# Patient Record
Sex: FEMALE | Race: WHITE | NOT HISPANIC OR LATINO | Employment: OTHER | URBAN - METROPOLITAN AREA
[De-identification: names, ages, dates, MRNs, and addresses within clinical notes are randomized per-mention and may not be internally consistent; named-entity substitution may affect disease eponyms.]

---

## 2023-01-25 PROBLEM — E04.1 THYROID NODULE: Status: ACTIVE | Noted: 2023-01-25

## 2023-01-25 PROBLEM — M79.10 MYALGIA: Status: ACTIVE | Noted: 2023-01-25

## 2023-01-25 PROBLEM — R42 VERTIGO: Status: ACTIVE | Noted: 2023-01-25

## 2023-01-25 PROBLEM — H93.19 TINNITUS: Status: ACTIVE | Noted: 2023-01-25

## 2023-01-25 PROBLEM — M48.02 STENOSIS, CERVICAL SPINE: Status: ACTIVE | Noted: 2023-01-25

## 2023-01-25 PROBLEM — M81.0 OSTEOPOROSIS: Status: ACTIVE | Noted: 2023-01-25

## 2023-01-25 PROBLEM — H53.9 VISUAL CHANGES: Status: ACTIVE | Noted: 2023-01-25

## 2023-01-25 PROBLEM — H81.10 BENIGN POSITIONAL VERTIGO: Status: ACTIVE | Noted: 2023-01-25

## 2023-01-25 PROBLEM — M54.50 LEFT-SIDED LOW BACK PAIN WITHOUT SCIATICA: Status: ACTIVE | Noted: 2023-01-25

## 2023-01-25 PROBLEM — M25.559 ARTHRALGIA OF HIP: Status: ACTIVE | Noted: 2023-01-25

## 2023-01-25 PROBLEM — R05.9 COUGH: Status: ACTIVE | Noted: 2023-01-25

## 2023-01-25 PROBLEM — S20.219A STERNAL CONTUSION: Status: ACTIVE | Noted: 2023-01-25

## 2023-01-25 PROBLEM — R39.9 URINARY SYMPTOM OR SIGN: Status: ACTIVE | Noted: 2023-01-25

## 2023-01-25 PROBLEM — R09.81 SINUS CONGESTION: Status: ACTIVE | Noted: 2023-01-25

## 2023-01-25 PROBLEM — R53.83 FATIGUE: Status: ACTIVE | Noted: 2023-01-25

## 2023-01-25 PROBLEM — M41.9 SCOLIOSIS: Status: ACTIVE | Noted: 2023-01-25

## 2023-01-25 PROBLEM — R79.82 CRP ELEVATED: Status: ACTIVE | Noted: 2023-01-25

## 2023-01-25 PROBLEM — R53.1 WEAKNESS GENERALIZED: Status: ACTIVE | Noted: 2023-01-25

## 2023-01-25 PROBLEM — R00.2 PALPITATIONS: Status: ACTIVE | Noted: 2023-01-25

## 2023-01-25 PROBLEM — R76.0 ABNORMAL ANTINUCLEAR ANTIBODY TITER: Status: ACTIVE | Noted: 2023-01-25

## 2023-01-25 PROBLEM — M54.2 NECK PAIN: Status: ACTIVE | Noted: 2023-01-25

## 2023-01-25 PROBLEM — M25.579 ANKLE PAIN: Status: ACTIVE | Noted: 2023-01-25

## 2023-01-25 PROBLEM — R06.00 DYSPNEA: Status: ACTIVE | Noted: 2023-01-25

## 2023-01-25 PROBLEM — R51.9 HEAD ACHE: Status: ACTIVE | Noted: 2023-01-25

## 2023-01-25 PROBLEM — R29.3 POOR POSTURE: Status: ACTIVE | Noted: 2023-01-25

## 2023-01-25 PROBLEM — R07.89 ATYPICAL CHEST PAIN: Status: ACTIVE | Noted: 2023-01-25

## 2023-01-25 PROBLEM — M25.50 ARTHRALGIA: Status: ACTIVE | Noted: 2023-01-25

## 2023-01-25 PROBLEM — R03.0 ELEVATED BLOOD PRESSURE READING: Status: ACTIVE | Noted: 2023-01-25

## 2023-01-25 PROBLEM — M26.629 TMJ SYNDROME: Status: ACTIVE | Noted: 2023-01-25

## 2023-01-25 PROBLEM — M16.9 OSTEOARTHRITIS OF HIP: Status: ACTIVE | Noted: 2023-01-25

## 2023-01-25 PROBLEM — M54.9 BACK PAIN: Status: ACTIVE | Noted: 2023-01-25

## 2023-01-25 PROBLEM — F41.9 ANXIETY: Status: ACTIVE | Noted: 2023-01-25

## 2023-01-25 PROBLEM — E78.00 HYPERCHOLESTEREMIA: Status: ACTIVE | Noted: 2023-01-25

## 2023-01-25 RX ORDER — DULOXETIN HYDROCHLORIDE 60 MG/1
1 CAPSULE, DELAYED RELEASE ORAL DAILY
COMMUNITY
Start: 2021-12-08 | End: 2023-05-31 | Stop reason: SDUPTHER

## 2023-01-25 RX ORDER — CYCLOSPORINE 0.5 MG/ML
1 EMULSION OPHTHALMIC 2 TIMES DAILY
COMMUNITY
Start: 2021-03-16

## 2023-01-25 RX ORDER — ALIROCUMAB 150 MG/ML
150 INJECTION, SOLUTION SUBCUTANEOUS
COMMUNITY
End: 2023-12-17 | Stop reason: SDUPTHER

## 2023-01-25 RX ORDER — LORAZEPAM 1 MG/1
1-2 TABLET ORAL
COMMUNITY
End: 2023-01-25 | Stop reason: ENTERED-IN-ERROR

## 2023-01-25 RX ORDER — FLUTICASONE PROPIONATE 50 MCG
2 SPRAY, SUSPENSION (ML) NASAL DAILY
COMMUNITY
Start: 2021-06-01 | End: 2023-05-11 | Stop reason: WASHOUT

## 2023-01-25 RX ORDER — TRETINOIN 0.25 MG/G
CREAM TOPICAL DAILY
COMMUNITY
Start: 2021-07-15 | End: 2023-10-18 | Stop reason: ALTCHOICE

## 2023-03-09 ENCOUNTER — OFFICE VISIT (OUTPATIENT)
Dept: PRIMARY CARE | Facility: CLINIC | Age: 68
End: 2023-03-09
Payer: MEDICARE

## 2023-03-09 VITALS
WEIGHT: 121.5 LBS | SYSTOLIC BLOOD PRESSURE: 150 MMHG | DIASTOLIC BLOOD PRESSURE: 82 MMHG | HEART RATE: 82 BPM | TEMPERATURE: 97.9 F | BODY MASS INDEX: 22.22 KG/M2

## 2023-03-09 DIAGNOSIS — D72.818 OTHER DECREASED WHITE BLOOD CELL (WBC) COUNT: ICD-10-CM

## 2023-03-09 DIAGNOSIS — M81.8 OTHER OSTEOPOROSIS WITHOUT CURRENT PATHOLOGICAL FRACTURE: ICD-10-CM

## 2023-03-09 DIAGNOSIS — Z00.00 ROUTINE GENERAL MEDICAL EXAMINATION AT A HEALTH CARE FACILITY: ICD-10-CM

## 2023-03-09 DIAGNOSIS — I10 ESSENTIAL HYPERTENSION: Primary | ICD-10-CM

## 2023-03-09 PROBLEM — R03.0 ELEVATED BLOOD PRESSURE READING: Status: RESOLVED | Noted: 2023-01-25 | Resolved: 2023-03-09

## 2023-03-09 PROBLEM — R06.00 DYSPNEA: Status: RESOLVED | Noted: 2023-01-25 | Resolved: 2023-03-09

## 2023-03-09 PROBLEM — S20.219A STERNAL CONTUSION: Status: RESOLVED | Noted: 2023-01-25 | Resolved: 2023-03-09

## 2023-03-09 PROBLEM — R39.9 URINARY SYMPTOM OR SIGN: Status: RESOLVED | Noted: 2023-01-25 | Resolved: 2023-03-09

## 2023-03-09 PROBLEM — R42 VERTIGO: Status: RESOLVED | Noted: 2023-01-25 | Resolved: 2023-03-09

## 2023-03-09 PROBLEM — H93.19 TINNITUS: Status: RESOLVED | Noted: 2023-01-25 | Resolved: 2023-03-09

## 2023-03-09 PROBLEM — R05.9 COUGH: Status: RESOLVED | Noted: 2023-01-25 | Resolved: 2023-03-09

## 2023-03-09 PROBLEM — R51.9 HEAD ACHE: Status: RESOLVED | Noted: 2023-01-25 | Resolved: 2023-03-09

## 2023-03-09 PROBLEM — M25.579 ANKLE PAIN: Status: RESOLVED | Noted: 2023-01-25 | Resolved: 2023-03-09

## 2023-03-09 PROCEDURE — 3079F DIAST BP 80-89 MM HG: CPT | Performed by: INTERNAL MEDICINE

## 2023-03-09 PROCEDURE — 1036F TOBACCO NON-USER: CPT | Performed by: INTERNAL MEDICINE

## 2023-03-09 PROCEDURE — 3077F SYST BP >= 140 MM HG: CPT | Performed by: INTERNAL MEDICINE

## 2023-03-09 PROCEDURE — 1160F RVW MEDS BY RX/DR IN RCRD: CPT | Performed by: INTERNAL MEDICINE

## 2023-03-09 PROCEDURE — 1159F MED LIST DOCD IN RCRD: CPT | Performed by: INTERNAL MEDICINE

## 2023-03-09 PROCEDURE — 99214 OFFICE O/P EST MOD 30 MIN: CPT | Performed by: INTERNAL MEDICINE

## 2023-03-09 RX ORDER — OLMESARTAN MEDOXOMIL 20 MG/1
20 TABLET ORAL DAILY
Qty: 90 TABLET | Refills: 0 | Status: SHIPPED | OUTPATIENT
Start: 2023-03-09 | End: 2023-05-31 | Stop reason: WASHOUT

## 2023-03-09 NOTE — ASSESSMENT & PLAN NOTE
Double vision of unclear etiology, glucose readings in the past unremarkable.  Seen by ophthalmology without etiology determined.

## 2023-03-09 NOTE — PROGRESS NOTES
Subjective   Stacey Davis is a 67 y.o. female who presents for Hypertension and Establish Care.  HPI  Pt is here for followup visit, last seen for establishment of care in January. Due for AWV     1/23: endocrinology referral for thyroid nodules.  2/23: labs notable for mild leukopenia repeat 1-2 months, lipids improved other labs good    PMHx:  - HLD - severe statin intolerance now on praluent followed by Dr. Baldwin with improvement in lipid profile. Father with MI at 46.   - Osteoporosis severe followed by rheum recommended medical management has had severe side effects on fosamax   - Anxiety and arthritic pain - longstanding on cymbalta 60mg concerned regarding weight gain potential.  Gets rib pain, left hip pain improved, pressure in cervical area.   - Vertigo + tinnitus - triggered by stress recommended vestibular rehab by ENT (son in law)   - S/p hysterectomy   - Elevated BP recommended home BP monitoring   - Family history of breast cancer no known genetic risk present (4/6 women in her family)  - Thyroid nodule - due for repeat imaging     Social:   -  works for the [employer]  - No tobacco, significant alcohol or drugs   - Lives at home with  (dentist) and dogs, children and grandchildren live in the city.    Review as systems as indicated in HPI, otherwise unremarkable.     Objective     /82   Pulse 82   Temp 36.6 °C (97.9 °F)   Wt 55.1 kg (121 lb 8 oz)   BMI 22.22 kg/m²      Physical Exam  Vitals reviewed.   Constitutional:       Appearance: Normal appearance.   HENT:      Head: Normocephalic and atraumatic.      Nose: Nose normal.   Eyes:      Conjunctiva/sclera: Conjunctivae normal.   Pulmonary:      Effort: Pulmonary effort is normal.   Neurological:      Mental Status: She is alert.   Psychiatric:         Mood and Affect: Mood normal.         Behavior: Behavior normal.         Thought Content: Thought content normal.         Judgment: Judgment normal.         Problem  List Items Addressed This Visit          Circulatory    Essential hypertension - Primary     BP consistently elevated consistent with hypertension, will initiate olmesartan 20mg, check BMP in 2 weeks. Potential side effects reviewed.   - Continue home BP monitoring if possible.   Followup in 1-2 months          Relevant Medications    olmesartan (BENIcar) 20 mg tablet    Other Relevant Orders    Basic metabolic panel    Follow Up In [facility] - PCP       Musculoskeletal    Osteoporosis     Last seen by rheumatology intolerant to fosamax, encouraged followup to consider alternate treatment modalities          Other Visit Diagnoses       Other decreased white blood cell (WBC) count        Relevant Orders    CBC and Auto Differential    Follow Up In [facility] - PCP    Routine general medical examination at a health care facility        Relevant Orders    Follow Up In [facility] - PCP            Health maintenance:   - Cancer screening: pending colonoscopy tomorrow   AWV at next visit

## 2023-03-09 NOTE — ASSESSMENT & PLAN NOTE
Now on Praluent with improvement in LDL readings, will follow-up with Dr. Garnett regarding additional management.  She is currently on max dose.  No significant side effects.  Intolerant to all additional statins

## 2023-03-09 NOTE — ASSESSMENT & PLAN NOTE
Improved with cymbalta, though notable weight gain. Would continue current course without dosage adjustment. Continue healthy lifestyle

## 2023-03-09 NOTE — PATIENT INSTRUCTIONS
Stacey, your homework:   - Make an appointment with the endocrinologist, Dr. Megan Hagen   - For blood pressure, start OLMESARTAN 20MG once a day   - Check blood work in 2 weeks' time.   - Continue to monitor your home blood pressure readings     Followup with me in 1-2 months for ANNUAL WELLNESS VISIT

## 2023-03-09 NOTE — ASSESSMENT & PLAN NOTE
Last seen by rheumatology intolerant to fosamax, encouraged followup to consider alternate treatment modalities

## 2023-03-09 NOTE — ASSESSMENT & PLAN NOTE
Stable on repeat imaging, though with some characteristics that would prefer to be evaluated by endocrinology.  Encouraged her to schedule appointment with Dr. Hagen.

## 2023-03-09 NOTE — ASSESSMENT & PLAN NOTE
BP consistently elevated consistent with hypertension, will initiate olmesartan 20mg, check BMP in 2 weeks. Potential side effects reviewed.   - Continue home BP monitoring if possible.   Followup in 1-2 months

## 2023-05-02 DIAGNOSIS — H53.2 DOUBLE VISION: Primary | ICD-10-CM

## 2023-05-02 NOTE — PROGRESS NOTES
Spoke to patient's ophthalmologist over the phone. There are no concerning opthalmologic findings on examination to explain the patient's double vision. He has treated with prism lenses with improvement in symptoms, though that does not explain the underlying cause. After further discussion, elected to pursue follow up with neurology in consideration for further workup. She has an upcoming appointment scheduled on 5/11 for follow up with me as well. Will reach out to patient to begin scheduling with neurology.

## 2023-05-10 PROBLEM — R09.81 SINUS CONGESTION: Status: RESOLVED | Noted: 2023-01-25 | Resolved: 2023-05-10

## 2023-05-11 ENCOUNTER — OFFICE VISIT (OUTPATIENT)
Dept: PRIMARY CARE | Facility: CLINIC | Age: 68
End: 2023-05-11
Payer: MEDICARE

## 2023-05-11 VITALS
TEMPERATURE: 97.9 F | SYSTOLIC BLOOD PRESSURE: 138 MMHG | BODY MASS INDEX: 22.41 KG/M2 | HEART RATE: 76 BPM | DIASTOLIC BLOOD PRESSURE: 76 MMHG | WEIGHT: 122.5 LBS

## 2023-05-11 DIAGNOSIS — M48.02 STENOSIS, CERVICAL SPINE: ICD-10-CM

## 2023-05-11 DIAGNOSIS — E04.1 THYROID NODULE: ICD-10-CM

## 2023-05-11 DIAGNOSIS — H53.2 DOUBLE VISION: ICD-10-CM

## 2023-05-11 DIAGNOSIS — R07.89 ATYPICAL CHEST PAIN: ICD-10-CM

## 2023-05-11 DIAGNOSIS — H81.10 BENIGN PAROXYSMAL POSITIONAL VERTIGO, UNSPECIFIED LATERALITY: ICD-10-CM

## 2023-05-11 DIAGNOSIS — F41.9 ANXIETY: Primary | ICD-10-CM

## 2023-05-11 DIAGNOSIS — I10 ESSENTIAL HYPERTENSION: ICD-10-CM

## 2023-05-11 DIAGNOSIS — M81.0 OSTEOPOROSIS, UNSPECIFIED OSTEOPOROSIS TYPE, UNSPECIFIED PATHOLOGICAL FRACTURE PRESENCE: ICD-10-CM

## 2023-05-11 DIAGNOSIS — M25.50 ARTHRALGIA, UNSPECIFIED JOINT: ICD-10-CM

## 2023-05-11 DIAGNOSIS — E78.00 HYPERCHOLESTEREMIA: ICD-10-CM

## 2023-05-11 DIAGNOSIS — H53.9 VISUAL CHANGES: ICD-10-CM

## 2023-05-11 PROCEDURE — 1170F FXNL STATUS ASSESSED: CPT | Performed by: INTERNAL MEDICINE

## 2023-05-11 PROCEDURE — 1159F MED LIST DOCD IN RCRD: CPT | Performed by: INTERNAL MEDICINE

## 2023-05-11 PROCEDURE — 1036F TOBACCO NON-USER: CPT | Performed by: INTERNAL MEDICINE

## 2023-05-11 PROCEDURE — G0402 INITIAL PREVENTIVE EXAM: HCPCS | Performed by: INTERNAL MEDICINE

## 2023-05-11 PROCEDURE — 3078F DIAST BP <80 MM HG: CPT | Performed by: INTERNAL MEDICINE

## 2023-05-11 PROCEDURE — 3075F SYST BP GE 130 - 139MM HG: CPT | Performed by: INTERNAL MEDICINE

## 2023-05-11 PROCEDURE — 1160F RVW MEDS BY RX/DR IN RCRD: CPT | Performed by: INTERNAL MEDICINE

## 2023-05-11 PROCEDURE — 99214 OFFICE O/P EST MOD 30 MIN: CPT | Performed by: INTERNAL MEDICINE

## 2023-05-11 RX ORDER — AMLODIPINE BESYLATE 5 MG/1
5 TABLET ORAL DAILY
Qty: 30 TABLET | Refills: 11 | Status: SHIPPED | OUTPATIENT
Start: 2023-05-11 | End: 2023-05-31 | Stop reason: WASHOUT

## 2023-05-11 ASSESSMENT — ACTIVITIES OF DAILY LIVING (ADL)
DOING_HOUSEWORK: INDEPENDENT
MANAGING_FINANCES: INDEPENDENT
GROCERY_SHOPPING: INDEPENDENT
DRESSING: INDEPENDENT
TAKING_MEDICATION: INDEPENDENT
BATHING: INDEPENDENT

## 2023-05-11 ASSESSMENT — PATIENT HEALTH QUESTIONNAIRE - PHQ9
SUM OF ALL RESPONSES TO PHQ9 QUESTIONS 1 AND 2: 0
2. FEELING DOWN, DEPRESSED OR HOPELESS: NOT AT ALL
1. LITTLE INTEREST OR PLEASURE IN DOING THINGS: NOT AT ALL

## 2023-05-11 NOTE — ASSESSMENT & PLAN NOTE
BP readings remain uncontrolled despite olmesartan 20mg, will start amlodipine 5mg in addition.   Continue home blood pressure monitoring  Counseling provided on appropriate monitoring technique and measurement parameters.   Will prescribe combination pill once completing individual pills.

## 2023-05-11 NOTE — PROGRESS NOTES
Subjective   Reason for Visit: Stacey Davis is an 67 y.o. female here for a Medicare Wellness visit.     Past Medical, Surgical, and Family History reviewed and updated in chart.    Reviewed all medications by prescribing practitioner or clinical pharmacist (such as prescriptions, OTCs, herbal therapies and supplements) and documented in the medical record.  HPI  67F here for welcome to medicare visit and followup of chronic conditions, last seen in January for establishment of care.     4/23: mammo good  - Optho double vision prescribed prism.  Consider TA and Myasthenia gravis referred to neuromuscular neurology out of concern for MG or MS. She was recently seen by cardiology recommended stress test for prognostic purposes. She has been using the prism glasses which has been working for her. She made an appointment with Dr. Fernandez.   - Weight gain, continues to have progressive weight gain despite no changes in lifestyle. She continues to do pilates, hasn't chnged her diet. Has felt glynn sensation in her belly.    PMHx:  - Elevated BP recommended home monitoring on olmesartan 20mg believes that this improving. On average her blood pressure readings   - Thyroid nodule recommended endocirnology has upcoming appointment scheduled   - HLD with statin intolerance on ezetimibe and praluent  strong family history of CAD - [facility] by [facility], father with MI at 46 recommended stress testing for prognostic purposes.   - Osteoporosis severe followed by rheum recommended medical management - developed severe side effects to fosamax last bone density done 1/2020.   - Anxiety and severe arthritis - rib and left hip pain improved on cymbalta 60mg with some weight gain now well controlled   - Vertigo and Tinnitus - seen by ENT (son in law) gets frequent episodes,  frequent episodes recommended consideration for vestibular rehab. thought perhaps precipitated by stress. Not diagnosed with Meniere's.   - S/p hysterectomy    - Family history of breast cancer - gets regular breast cancer screening no known genetic variant     Family:   - 4/6 women in her family have breast cancer, no known genetic pathology found     Lifestyle   - Diet - very healthy as described   - Exercise - peloton, pilates, walks a lot     Social:   -  works for the [email or web address]   - No tobacco, significant alcohol or drugs   - Lives at home with  (dentist) and dogs, children and grandchildren live in the city     Patient Care Team:  Kaleigh Andersen DO as PCP - General  Asia Bob MD as PCP - [facility] Attributed Provider     Review of Systems  General: No fevers, chills, significant changes in weight  HEENT: as described   Heart: No chest pain, palpitations, dyspnea on exertion  Lungs: No cough, wheezing, shortness of breath  Abdomen: No diarrhea, constipation, no nausea, no vomiting, no blood in stool, no dark stools.  Heme: No bleeding or thrombosis issues  Musculoskeletal: as described   Neuro: No weakness, sensory deficits, confusion.   Psych: +anxiety as described   Urinary: No urinary complaints     Objective   Vitals:  /76   Pulse 76   Temp 36.6 °C (97.9 °F)   Wt 55.6 kg (122 lb 8 oz)   BMI 22.41 kg/m²       Physical Exam  General: Appears comfortable, NAD, appropriate affect  HEENT: NCAT, EOMI, pupils symmetric, no conjunctival erythema   Neck: Supple, no LAD   Heart: RRR S1 S2 no murmurs appreciated   Lungs: CTA bilaterally, no rhonchi, rales, or wheezes   Abdomen: Soft, NT/ND, no rebound or guarding, NABS   Extremities: no cyanosis or edema appreciated  Neuro: AAO x 3, answers questions appropriately, no FND, gait unremarkable      Assessment/Plan   Problem List Items Addressed This Visit          Nervous    Stenosis, cervical spine    Current Assessment & Plan     Pain improvement with cymbalta, will hold off on discontinuation.            Circulatory    Essential hypertension    Current Assessment & Plan     BP  readings remain uncontrolled despite olmesartan 20mg, will start amlodipine 5mg in addition.   Continue home blood pressure monitoring  Counseling provided on appropriate monitoring technique and measurement parameters.   Will prescribe combination pill once completing individual pills.            Relevant Medications    amLODIPine (Norvasc) 5 mg tablet    Other Relevant Orders    Follow Up In [facility] - PCP       Musculoskeletal    Arthralgia    Current Assessment & Plan     On cymbalta though with concerned about excess weight gain on it.   This medication does help control her symptoms and keeps her exercising. At present, the benefits of this medication appear to outweigh the risks.          Osteoporosis    Overview     Begin resistance exercise           Current Assessment & Plan     To followup with rheumatology regarding medical management options.            Endocrine/Metabolic    Thyroid nodule    Current Assessment & Plan     Pending evaluation with endocrinology, visit scheduled.            Other    Anxiety - Primary    Current Assessment & Plan     Improved with cymbalta 60mg          Atypical chest pain    Current Assessment & Plan     Pending stress testing          Benign positional vertigo    Hypercholesteremia    Overview     AVOID EXOGENOUS CHOLESTEROL         Visual changes    Relevant Orders    MR brain w and wo IV contrast    Creatinine, Serum    Double vision    Current Assessment & Plan     Unclear etiology, improved with prism vision. No known pathology per optho. Has upcoming appt scheduled with neurology for further workup but in the interim will obtain MRI with contrast for further evaluation.              Adult Health Examination  Age appropriate screening performed   Depression screen negative   No additional pertinent family history or toxic habits   No high risk sexual behavior,   Cancer screening:   - Colonoscopy 3/23: repeat 5 years   - Mammogram 4/23   - Pap smear 12/16  has had full hysterectomy   - Skin yearly FSE with Dr. Fuchs at [facility]   Immunizations: UTD with flu shot, consider second bivalent booster, UTD with pneumococcal vaccination series, did get shingles 10 years ago.   Dental and visual examinations   Discussed adequate Vitamin D intake     Followup 3 months

## 2023-05-11 NOTE — ASSESSMENT & PLAN NOTE
Unclear etiology, improved with prism vision. No known pathology per optho. Has upcoming appt scheduled with neurology for further workup but in the interim will obtain MRI with contrast for further evaluation.

## 2023-05-11 NOTE — PATIENT INSTRUCTIONS
It was a pleasure to see you today! Here is a list of things we have discussed and to follow up on:   Blood pressure - we are adding AMLODIPINE 5MG to olmesartan 20mg. Continue to measure home blood pressure readings. When you run out, send me a message and I will send both of these medications in a combination pill.   For double vision - I have ordered an MRI of the brain. Keep your appointment with the neurologist   Consider second COVID bivalent booster and SHINGRIX vaccination series (2 doses  by 2-6 months)  Followup in 2-3 months

## 2023-05-11 NOTE — ASSESSMENT & PLAN NOTE
On cymbalta though with concerned about excess weight gain on it.   This medication does help control her symptoms and keeps her exercising. At present, the benefits of this medication appear to outweigh the risks.

## 2023-05-30 ENCOUNTER — TELEPHONE (OUTPATIENT)
Dept: PRIMARY CARE | Facility: CLINIC | Age: 68
End: 2023-05-30
Payer: MEDICARE

## 2023-05-30 DIAGNOSIS — M25.559 ARTHRALGIA OF HIP, UNSPECIFIED LATERALITY: Primary | ICD-10-CM

## 2023-05-31 DIAGNOSIS — I10 ESSENTIAL HYPERTENSION: Primary | ICD-10-CM

## 2023-05-31 RX ORDER — AMLODIPINE AND OLMESARTAN MEDOXOMIL 5; 20 MG/1; MG/1
1 TABLET ORAL DAILY
Qty: 90 TABLET | Refills: 1 | Status: SHIPPED | OUTPATIENT
Start: 2023-05-31 | End: 2023-11-24

## 2023-05-31 RX ORDER — DULOXETIN HYDROCHLORIDE 60 MG/1
60 CAPSULE, DELAYED RELEASE ORAL DAILY
Qty: 90 CAPSULE | Refills: 1 | Status: SHIPPED | OUTPATIENT
Start: 2023-05-31 | End: 2023-09-08 | Stop reason: SINTOL

## 2023-06-26 ENCOUNTER — LAB (OUTPATIENT)
Dept: LAB | Facility: LAB | Age: 68
End: 2023-06-26
Payer: MEDICARE

## 2023-06-26 DIAGNOSIS — H53.9 VISUAL CHANGES: ICD-10-CM

## 2023-06-26 LAB
CHOLESTEROL (MG/DL) IN SER/PLAS: 224 MG/DL (ref 0–199)
CHOLESTEROL IN HDL (MG/DL) IN SER/PLAS: 76 MG/DL
CHOLESTEROL/HDL RATIO: 2.9
CREATININE (MG/DL) IN SER/PLAS: 0.88 MG/DL (ref 0.5–1.05)
GFR FEMALE: 72 ML/MIN/1.73M2
LDL: 121 MG/DL (ref 0–99)
THYROPEROXIDASE AB (IU/ML) IN SER/PLAS: >1000 IU/ML
TRIGLYCERIDE (MG/DL) IN SER/PLAS: 133 MG/DL (ref 0–149)
VLDL: 27 MG/DL (ref 0–40)

## 2023-06-26 PROCEDURE — 82565 ASSAY OF CREATININE: CPT

## 2023-06-26 PROCEDURE — 36415 COLL VENOUS BLD VENIPUNCTURE: CPT

## 2023-06-29 LAB
LIPOPROTEIN A: 24 MG/DL
THYROID STIMULATING IMMUNOGLOBULIN: <1 TSI INDEX
TSH RECEPTOR ANTIBODY: <0.8 IU/L

## 2023-06-30 LAB
ACETYLCHOL MODUL AB: 4 %
ACHR BINDING AB, SERUM: 0 NMOL/L (ref 0–0.4)
ACHR BLOCKING ABS, SERUM: 21 % (ref 0–26)

## 2023-07-10 ENCOUNTER — TELEPHONE (OUTPATIENT)
Dept: PRIMARY CARE | Facility: CLINIC | Age: 68
End: 2023-07-10
Payer: MEDICARE

## 2023-08-15 ENCOUNTER — APPOINTMENT (OUTPATIENT)
Dept: PRIMARY CARE | Facility: CLINIC | Age: 68
End: 2023-08-15
Payer: MEDICARE

## 2023-08-18 ENCOUNTER — APPOINTMENT (OUTPATIENT)
Dept: PRIMARY CARE | Facility: CLINIC | Age: 68
End: 2023-08-18
Payer: MEDICARE

## 2023-08-28 LAB — THYROTROPIN (MIU/L) IN SER/PLAS BY DETECTION LIMIT <= 0.05 MIU/L: 1.25 MIU/L (ref 0.44–3.98)

## 2023-09-07 ENCOUNTER — TELEPHONE (OUTPATIENT)
Dept: PRIMARY CARE | Facility: CLINIC | Age: 68
End: 2023-09-07
Payer: MEDICARE

## 2023-09-08 DIAGNOSIS — M25.50 ARTHRALGIA, UNSPECIFIED JOINT: Primary | ICD-10-CM

## 2023-09-08 RX ORDER — DULOXETINE 40 MG/1
1 CAPSULE, DELAYED RELEASE ORAL DAILY
Qty: 7 CAPSULE | Refills: 0 | Status: SHIPPED | OUTPATIENT
Start: 2023-09-08 | End: 2023-10-17 | Stop reason: WASHOUT

## 2023-09-08 RX ORDER — DULOXETIN HYDROCHLORIDE 30 MG/1
30 CAPSULE, DELAYED RELEASE ORAL DAILY
Qty: 7 CAPSULE | Refills: 0 | Status: SHIPPED | OUTPATIENT
Start: 2023-09-08 | End: 2023-10-17 | Stop reason: SDUPTHER

## 2023-09-08 RX ORDER — DULOXETIN HYDROCHLORIDE 20 MG/1
40 CAPSULE, DELAYED RELEASE ORAL DAILY
Qty: 30 CAPSULE | Refills: 0 | Status: SHIPPED | OUTPATIENT
Start: 2023-09-08 | End: 2023-10-17 | Stop reason: WASHOUT

## 2023-09-08 NOTE — PROGRESS NOTES
Patient calling requesting tapering Cymbalta.  Recommend reducing to 40 mg/day for 1 week, then 30 mg/day for 1 week then 20 mg for 1 to 2 weeks then stop if possible.  Has upcoming appointment scheduled next month for review.  Prescription sent.

## 2023-10-16 ENCOUNTER — TELEPHONE (OUTPATIENT)
Dept: PRIMARY CARE | Facility: CLINIC | Age: 68
End: 2023-10-16
Payer: MEDICARE

## 2023-10-17 ENCOUNTER — LAB (OUTPATIENT)
Dept: LAB | Facility: LAB | Age: 68
End: 2023-10-17
Payer: MEDICARE

## 2023-10-17 ENCOUNTER — TELEPHONE (OUTPATIENT)
Dept: PRIMARY CARE | Facility: CLINIC | Age: 68
End: 2023-10-17

## 2023-10-17 ENCOUNTER — OFFICE VISIT (OUTPATIENT)
Dept: PRIMARY CARE | Facility: CLINIC | Age: 68
End: 2023-10-17
Payer: MEDICARE

## 2023-10-17 VITALS
DIASTOLIC BLOOD PRESSURE: 73 MMHG | HEART RATE: 81 BPM | TEMPERATURE: 97.2 F | SYSTOLIC BLOOD PRESSURE: 116 MMHG | BODY MASS INDEX: 22.57 KG/M2 | WEIGHT: 123.38 LBS

## 2023-10-17 DIAGNOSIS — M26.69 JAW CLAUDICATION: ICD-10-CM

## 2023-10-17 DIAGNOSIS — Z00.00 ENCOUNTER FOR PREVENTATIVE ADULT HEALTH CARE EXAMINATION: ICD-10-CM

## 2023-10-17 DIAGNOSIS — I10 ESSENTIAL HYPERTENSION: ICD-10-CM

## 2023-10-17 DIAGNOSIS — Z00.00 ENCOUNTER FOR PREVENTATIVE ADULT HEALTH CARE EXAMINATION: Primary | ICD-10-CM

## 2023-10-17 DIAGNOSIS — M25.50 ARTHRALGIA, UNSPECIFIED JOINT: ICD-10-CM

## 2023-10-17 DIAGNOSIS — M81.6 LOCALIZED OSTEOPOROSIS WITHOUT CURRENT PATHOLOGICAL FRACTURE: ICD-10-CM

## 2023-10-17 DIAGNOSIS — D72.818 OTHER DECREASED WHITE BLOOD CELL (WBC) COUNT: ICD-10-CM

## 2023-10-17 LAB
ALBUMIN SERPL BCP-MCNC: 4.8 G/DL (ref 3.4–5)
ALP SERPL-CCNC: 104 U/L (ref 33–136)
ALT SERPL W P-5'-P-CCNC: 20 U/L (ref 7–45)
ANION GAP SERPL CALC-SCNC: 16 MMOL/L (ref 10–20)
APPEARANCE UR: ABNORMAL
AST SERPL W P-5'-P-CCNC: 20 U/L (ref 9–39)
BASOPHILS # BLD AUTO: 0.05 X10*3/UL (ref 0–0.1)
BASOPHILS NFR BLD AUTO: 0.5 %
BILIRUB SERPL-MCNC: 0.6 MG/DL (ref 0–1.2)
BILIRUB UR STRIP.AUTO-MCNC: NEGATIVE MG/DL
BUN SERPL-MCNC: 26 MG/DL (ref 6–23)
CALCIUM SERPL-MCNC: 10.3 MG/DL (ref 8.6–10.6)
CHLORIDE SERPL-SCNC: 102 MMOL/L (ref 98–107)
CO2 SERPL-SCNC: 24 MMOL/L (ref 21–32)
COLOR UR: YELLOW
CREAT SERPL-MCNC: 0.95 MG/DL (ref 0.5–1.05)
CRP SERPL-MCNC: 0.63 MG/DL
EOSINOPHIL # BLD AUTO: 0.06 X10*3/UL (ref 0–0.7)
EOSINOPHIL NFR BLD AUTO: 0.6 %
ERYTHROCYTE [DISTWIDTH] IN BLOOD BY AUTOMATED COUNT: 12.4 % (ref 11.5–14.5)
ERYTHROCYTE [SEDIMENTATION RATE] IN BLOOD BY WESTERGREN METHOD: 15 MM/H (ref 0–30)
GFR SERPL CREATININE-BSD FRML MDRD: 66 ML/MIN/1.73M*2
GLUCOSE SERPL-MCNC: 99 MG/DL (ref 74–99)
GLUCOSE UR STRIP.AUTO-MCNC: NEGATIVE MG/DL
HCT VFR BLD AUTO: 40.2 % (ref 36–46)
HGB BLD-MCNC: 13.2 G/DL (ref 12–16)
HYALINE CASTS #/AREA URNS AUTO: ABNORMAL /LPF
IMM GRANULOCYTES # BLD AUTO: 0.05 X10*3/UL (ref 0–0.7)
IMM GRANULOCYTES NFR BLD AUTO: 0.5 % (ref 0–0.9)
KETONES UR STRIP.AUTO-MCNC: NEGATIVE MG/DL
LEUKOCYTE ESTERASE UR QL STRIP.AUTO: NEGATIVE
LYMPHOCYTES # BLD AUTO: 1.82 X10*3/UL (ref 1.2–4.8)
LYMPHOCYTES NFR BLD AUTO: 17.9 %
MCH RBC QN AUTO: 29.4 PG (ref 26–34)
MCHC RBC AUTO-ENTMCNC: 32.8 G/DL (ref 32–36)
MCV RBC AUTO: 90 FL (ref 80–100)
MONOCYTES # BLD AUTO: 1.07 X10*3/UL (ref 0.1–1)
MONOCYTES NFR BLD AUTO: 10.5 %
NEUTROPHILS # BLD AUTO: 7.14 X10*3/UL (ref 1.2–7.7)
NEUTROPHILS NFR BLD AUTO: 70 %
NITRITE UR QL STRIP.AUTO: NEGATIVE
NRBC BLD-RTO: 0 /100 WBCS (ref 0–0)
PH UR STRIP.AUTO: 5 [PH]
PLATELET # BLD AUTO: 304 X10*3/UL (ref 150–450)
PMV BLD AUTO: 10.3 FL (ref 7.5–11.5)
POTASSIUM SERPL-SCNC: 4.1 MMOL/L (ref 3.5–5.3)
PROT SERPL-MCNC: 7.7 G/DL (ref 6.4–8.2)
PROT UR STRIP.AUTO-MCNC: NEGATIVE MG/DL
RBC # BLD AUTO: 4.49 X10*6/UL (ref 4–5.2)
RBC # UR STRIP.AUTO: ABNORMAL /UL
RBC #/AREA URNS AUTO: ABNORMAL /HPF
SODIUM SERPL-SCNC: 138 MMOL/L (ref 136–145)
SP GR UR STRIP.AUTO: 1.02
SQUAMOUS #/AREA URNS AUTO: ABNORMAL /HPF
TSH SERPL-ACNC: 2.15 MIU/L (ref 0.44–3.98)
UROBILINOGEN UR STRIP.AUTO-MCNC: <2 MG/DL
WBC # BLD AUTO: 10.2 X10*3/UL (ref 4.4–11.3)
WBC #/AREA URNS AUTO: ABNORMAL /HPF

## 2023-10-17 PROCEDURE — 84443 ASSAY THYROID STIM HORMONE: CPT

## 2023-10-17 PROCEDURE — 85652 RBC SED RATE AUTOMATED: CPT

## 2023-10-17 PROCEDURE — 3078F DIAST BP <80 MM HG: CPT | Performed by: INTERNAL MEDICINE

## 2023-10-17 PROCEDURE — 1036F TOBACCO NON-USER: CPT | Performed by: INTERNAL MEDICINE

## 2023-10-17 PROCEDURE — 1159F MED LIST DOCD IN RCRD: CPT | Performed by: INTERNAL MEDICINE

## 2023-10-17 PROCEDURE — 85025 COMPLETE CBC W/AUTO DIFF WBC: CPT

## 2023-10-17 PROCEDURE — 1126F AMNT PAIN NOTED NONE PRSNT: CPT | Performed by: INTERNAL MEDICINE

## 2023-10-17 PROCEDURE — 3074F SYST BP LT 130 MM HG: CPT | Performed by: INTERNAL MEDICINE

## 2023-10-17 PROCEDURE — 80053 COMPREHEN METABOLIC PANEL: CPT

## 2023-10-17 PROCEDURE — 86140 C-REACTIVE PROTEIN: CPT

## 2023-10-17 PROCEDURE — 36415 COLL VENOUS BLD VENIPUNCTURE: CPT

## 2023-10-17 PROCEDURE — 84165 PROTEIN E-PHORESIS SERUM: CPT

## 2023-10-17 PROCEDURE — 99215 OFFICE O/P EST HI 40 MIN: CPT | Performed by: INTERNAL MEDICINE

## 2023-10-17 PROCEDURE — 81001 URINALYSIS AUTO W/SCOPE: CPT

## 2023-10-17 PROCEDURE — 1160F RVW MEDS BY RX/DR IN RCRD: CPT | Performed by: INTERNAL MEDICINE

## 2023-10-17 RX ORDER — PREDNISONE 20 MG/1
40 TABLET ORAL DAILY
Qty: 20 TABLET | Refills: 0 | Status: SHIPPED | OUTPATIENT
Start: 2023-10-17 | End: 2023-10-27

## 2023-10-17 RX ORDER — DULOXETIN HYDROCHLORIDE 30 MG/1
30 CAPSULE, DELAYED RELEASE ORAL DAILY
Qty: 7 CAPSULE | Refills: 0 | Status: SHIPPED | OUTPATIENT
Start: 2023-10-17 | End: 2023-10-24 | Stop reason: SDUPTHER

## 2023-10-17 NOTE — PATIENT INSTRUCTIONS
Call your ophthalmologist and let him know that there is a clinical concern for TEMPORAL ARTERITIS   I have ordered blood and/or urine tests for you to do today. The lab can be found on this floor (2nd floor) next to the pharmacy across from the elevators.    Start prednisone 40mg 1 tablet per day   I will be in touch with you later today.

## 2023-10-17 NOTE — PROGRESS NOTES
Subjective   Patient ID: Stacey Davis is a 68 y.o. female who presents for NOT FEELING WELL.  HPI  68F here for followup visit, last seen in May for AWV.     - Anxiety and severe arthritis - rib and left hip pain improved on cymbalta 60mg with some weight gain, now titrated down from cymbalta which subsequently discontinued on 10/1. She noted that she was having breakthrough pain, brain fog as she gradually downtitrated the cymbalta. She notes that her tinnitus is now pulsating, has had recurrent double vision despite no recent changes noted by ophthalmology. Describes pain on the bilateral join line and pain on her bilateral carotid arteries, groin and left hip. Denies fevers, chills, weight changes, blood pressure readings have been well controlled, no rashes, no recent travel, no respiratory symptoms.   - Daughter recently got hand foot and mouth, two grandchildren recently with respiratory illnesses. She was seen by Dr. Fernandez for double vision and MRI findings concerning for MS no significant abnormalities  noted recommended continuation of prisms   She notes continued dizziness, severe headaches (no vertigo), scalp sensitivity diffusely, photosensitivity, chills where she needs to go under a sheet, notes achiness in her shoulders bilaterally as well as hips. She had one episode of nb/nb emesis on Monday.       PMHx:  - Elevated BP recommended home monitoring on olmesartan 20mg  - Thyroid nodule seen by endocrinology recommended repeat thyroid ultrasound in 1 year   - HLD with statin intolerance on ezetimibe and praluent  strong family history of CAD - [facility] by [facility], father with MI at 46 recommended as needed follow-up with Dr. Garnett  - Osteoporosis severe followed by rheum recommended medical management - developed severe side effects to fosamax last bone density done 1/2020.   - Vertigo and Tinnitus - seen by ENT (son in law) gets frequent episodes,  frequent episodes recommended consideration  for vestibular rehab. thought perhaps precipitated by stress. Not diagnosed with Meniere's.   - S/p hysterectomy   - Family history of breast cancer - gets regular breast cancer screening no known genetic variant     Family:   - 4/6 women in her family.  Have breast cancer, no known genetic pathology found     Social:   -  works for the [employer]   - No tobacco, significant alcohol or drugs   - Lives at home with  (dentist) and dogs, children and grandchildren live in the city   Current Outpatient Medications   Medication Instructions    amlodipine-olmesartan (Helen) 5-20 mg tablet 1 tablet, oral, Daily    cholecalciferol, vitamin D3, (VITAMIN D3 ORAL) No dose, route, or frequency recorded.    cycloSPORINE (Restasis) 0.05 % ophthalmic emulsion 1 drop, Both Eyes, 2 times daily    DULoxetine (CYMBALTA) 30 mg, oral, Daily, Do not crush or chew.    Praluent Pen 150 mg, subcutaneous, Once every 2 weeks    predniSONE (DELTASONE) 40 mg, oral, Daily    tretinoin (Retin-A) 0.025 % cream Daily        Objective     /73   Pulse 81   Temp 36.2 °C (97.2 °F)   Wt 56 kg (123 lb 6 oz)   BMI 22.57 kg/m²   BP left arm 147/88, right arm 149/90     Physical Exam  General: Appears uncomfortable, anxious and in distress.   HEENT: NCAT, EOMI, pupils symmetric, no conjunctival erythema   Neck: Supple, no LAD,  tenderness appreciated bilateral neck, no bruits appreciated  Heart: RRR S1 S2 no murmurs appreciated  Lungs: CTA bilaterally, no rhonchi, rales, or wheezes   Abdomen: Soft, NT/ND, no rebound or guarding, NABS   Extremities: no cyanosis or edema appreciated  Neuro: AAO x 3, answers questions appropriately, no FND, gait unremarkable    Assessment/Plan   Problem List Items Addressed This Visit       Arthralgia  With gradual downtitration of cymbalta at patient's request due to concerns regarding her weight and subsequent worsening of polyarthralgia. No significant reductions in weight after discontinuation.  Advised reinstitution of cymbalta.     Relevant Medications    DULoxetine (Cymbalta) 30 mg DR capsule    Essential hypertension    Relevant Orders    CBC and Auto Differential     Other Visit Diagnoses       Encounter for preventative adult health care examination    -  Primary    Relevant Orders    CBC and Auto Differential    Comprehensive Metabolic Panel    TSH with reflex to Free T4 if abnormal    Jaw claudication    Satisfies potential diagnostic criteria for GCA (score 6) with associated symptoms. Will obtain bloodwork including inflammatory markers, start prednisone 40mg, referral stat to vascular surgery urgently in consideration for biopsy. No e/o claudication on examination, no changes in visual symptoms from prior. Suggest close clinical follow-up.        Relevant Medications    predniSONE (Deltasone) 20 mg tablet    Other Relevant Orders    Sedimentation Rate    C-reactive protein    Urinalysis with Reflex Microscopic    Referral to Vascular Surgery     Abnormal MRI   With Thompson's fingers concerning for MS, none noted by neuro-ophthalmology, advised followup with neurology for confirmation.     Thyroid nodules with positive TPO   Seen by Dr. Hagen recommended repeat ultrasound in 1 year. TFTs wnl with positive TPO antibodies, will continue to monitor thyroid function.

## 2023-10-18 ENCOUNTER — OFFICE VISIT (OUTPATIENT)
Dept: PRIMARY CARE | Facility: CLINIC | Age: 68
End: 2023-10-18
Payer: MEDICARE

## 2023-10-18 ENCOUNTER — TELEPHONE (OUTPATIENT)
Dept: PRIMARY CARE | Facility: CLINIC | Age: 68
End: 2023-10-18

## 2023-10-18 VITALS — BODY MASS INDEX: 22.31 KG/M2 | WEIGHT: 122 LBS | SYSTOLIC BLOOD PRESSURE: 122 MMHG | DIASTOLIC BLOOD PRESSURE: 70 MMHG

## 2023-10-18 DIAGNOSIS — M79.10 MYALGIA: Primary | ICD-10-CM

## 2023-10-18 PROCEDURE — 1126F AMNT PAIN NOTED NONE PRSNT: CPT | Performed by: INTERNAL MEDICINE

## 2023-10-18 PROCEDURE — 1159F MED LIST DOCD IN RCRD: CPT | Performed by: INTERNAL MEDICINE

## 2023-10-18 PROCEDURE — 1036F TOBACCO NON-USER: CPT | Performed by: INTERNAL MEDICINE

## 2023-10-18 PROCEDURE — 3074F SYST BP LT 130 MM HG: CPT | Performed by: INTERNAL MEDICINE

## 2023-10-18 PROCEDURE — 99214 OFFICE O/P EST MOD 30 MIN: CPT | Performed by: INTERNAL MEDICINE

## 2023-10-18 PROCEDURE — 3078F DIAST BP <80 MM HG: CPT | Performed by: INTERNAL MEDICINE

## 2023-10-18 PROCEDURE — 1160F RVW MEDS BY RX/DR IN RCRD: CPT | Performed by: INTERNAL MEDICINE

## 2023-10-18 NOTE — PROGRESS NOTES
Subjective   Patient ID: Stacey Davis is a 68 y.o. female.    HPI  Patient presents today in follow-up and to reform a relationship with me.  She is 68 years old and past medical history significant for  Hypercholesteremia she is now seeing cardiology and has been taking Repatha with good success  Hypertension  Osteoporosis severe with T-scores less than -3.0 has opted conservative treatment I believe last bone densitometry was 2020  She had developed multiple issues including ptosis and double vision saw neuro-ophthalmology was concerned MRI performed  ?  Nonspecific Thompson's fingers now has prisms in her glasses  During that work-up found to have Hashimoto's thyroiditis she is seeing Dr. Baker she does have a history of thyroid nodules and recommendation to follow-up ultrasound in 1 year and just follow thyroid functions which have been normal  Seeing Dr. Andersen  She was weaned off the Cymbalta which was started some time ago   And very poorly since she stopped the Cymbalta and really just has not been feeling well for some time  She finds that she gets weak and tired very easily is unable to complete any of her exercise regimens and she is always exercise routinely  She had gained weight which she thought was from the Cymbalta and this was very irritating for this was the main reason to stop Cymbalta she does think she felt better on the Cymbalta  She saw  today complaining of some headache feeling like her neck was tight her jaw was tight she always has had TMJ issues  Did not have any loss of vision and has not had any worsening of her visual issues concern was that of temporal arteritis so sed rate CRP were obtained they are normal she had been given an appointment to see vascular not sure she needs to complete this with normal CRP and sed rate  Frustrated and just not feeling well at all just not like herself has not even really wanted to interact with the grandchildren recently      Review of  Systems    Objective   Physical Exam  Developed no acute distress does appear fatigued and different from her baseline  HEENT exam is unremarkable the temporal arteries are not palpable there is no pain to palpation in the temporal arteries  The lungs are clear  Cardiovascular regular rate and rhythm  Periphery is without edema  Gait is normal  She has good range of motion able to reach over her head with no difficulty  Lab Results   Component Value Date    CRP 0.63 10/17/2023      Lab Results   Component Value Date    SEDRATE 15 10/17/2023      Lab Results   Component Value Date    GLUCOSE 99 10/17/2023    CALCIUM 10.3 10/17/2023     10/17/2023    K 4.1 10/17/2023    CO2 24 10/17/2023     10/17/2023    BUN 26 (H) 10/17/2023    CREATININE 0.95 10/17/2023          Assessment/Plan   #1 myalgias fatigue and general feeling poorly etiology is unclear I do not think this represents temporal arteritis or PMR as her sed rate and CRP are normal she really has been feeling poorly for some time and has had neurologic issues with double vision question of ptosis work-up for myasthenia gravis is normal  She has seen neuro-ophthalmology I with nonspecific finding of these Thompson's fingers doubt this represents a demyelinating disease however with her extreme fatigue I would like her to see neurology and this referral has been placed  2.  Hashimoto's thyroiditis but normal thyroid functions and has thyroid nodule she will continue to follow with endocrinology Dr Hagen  #3 hypertension blood pressure is in good control continue current regimen  4.  Hypercholesteremia seems to be tolerating the Repatha well she follows routinely with cardiology Dr Baldwin  #5 osteoporosis she has had T-scores lower than -3 has been strongly recommended medications which she has been resistant to taking mostly because of the potential dental issues relatively we need to follow-up with bone density scan we discussed the risk of  spontaneous fractures most likely we will have her see rheumatology again  6.  Depression some of this situational because she is feeling so poorly she been on Cymbalta we are using this more for her pain with a question of perhaps even a fibromyalgia she did not like the fact that she thought she was gaining weight on it so stopped but in retrospect definitely felt better on the Cymbalta we will restart 30 mg of Cymbalta  7.  Health maintenance  High-dose flu vaccine is given today  She is going to return for a comprehensive exam in the near future as well  40 minutes were spent with patient of which greater than 50% was spent in counseling and coordination of care  This note was partially generated using the Dragon voice recognition system.  There may be some incorrect wording ,grammar, spelling or punctuation errors that were not corrected prior to committing the note to the medical record.

## 2023-10-18 NOTE — PATIENT INSTRUCTIONS
Unfortunately I am not clear about the source of your symptoms.  I do want to add additional blood work to the blood work that was drawn something called a CPK which is a muscle enzyme test  I do not think you have temporal arteritis as your sed rate and CRP are normal  I do want to restart Cymbalta as we discussed and will restart at 30 mg daily see how you are doing over the next 7 to 10 days  I do think neurology follow-up is indicated with your weakness as well as muscle aches and pains  We will have you return for a comprehensive exam as well we will need to address the osteoporosis but I do not think this is adding into your symptoms at all

## 2023-10-20 DIAGNOSIS — Z00.00 ROUTINE HEALTH MAINTENANCE: ICD-10-CM

## 2023-10-20 DIAGNOSIS — M25.50 ARTHRALGIA, UNSPECIFIED JOINT: ICD-10-CM

## 2023-10-20 DIAGNOSIS — M81.6 LOCALIZED OSTEOPOROSIS WITHOUT CURRENT PATHOLOGICAL FRACTURE: Primary | ICD-10-CM

## 2023-10-20 LAB — PROT SERPL-MCNC: 7.7 G/DL (ref 6.4–8.2)

## 2023-10-23 LAB
ALBUMIN: 4.6 G/DL (ref 3.4–5)
ALPHA 1 GLOBULIN: 0.4 G/DL (ref 0.2–0.6)
ALPHA 2 GLOBULIN: 0.9 G/DL (ref 0.4–1.1)
BETA GLOBULIN: 0.9 G/DL (ref 0.5–1.2)
GAMMA GLOBULIN: 0.9 G/DL (ref 0.5–1.4)
PATH REVIEW-SERUM PROTEIN ELECTROPHORESIS: NORMAL
PROTEIN ELECTROPHORESIS COMMENT: NORMAL

## 2023-10-24 ENCOUNTER — APPOINTMENT (OUTPATIENT)
Dept: PRIMARY CARE | Facility: CLINIC | Age: 68
End: 2023-10-24
Payer: MEDICARE

## 2023-10-24 ENCOUNTER — TELEPHONE (OUTPATIENT)
Dept: PRIMARY CARE | Facility: CLINIC | Age: 68
End: 2023-10-24

## 2023-10-24 RX ORDER — DULOXETIN HYDROCHLORIDE 30 MG/1
30 CAPSULE, DELAYED RELEASE ORAL DAILY
Qty: 30 CAPSULE | Refills: 3 | Status: SHIPPED | OUTPATIENT
Start: 2023-10-24 | End: 2024-02-13

## 2023-10-24 NOTE — TELEPHONE ENCOUNTER
She is on Cymbalta 30mg and it seems to be working should she stay on this dose or increase? If 30mg she needs another prescription called in [facility] on file

## 2023-10-26 ENCOUNTER — APPOINTMENT (OUTPATIENT)
Dept: VASCULAR SURGERY | Facility: CLINIC | Age: 68
End: 2023-10-26
Payer: MEDICARE

## 2023-10-31 ENCOUNTER — APPOINTMENT (OUTPATIENT)
Dept: PRIMARY CARE | Facility: CLINIC | Age: 68
End: 2023-10-31
Payer: MEDICARE

## 2023-10-31 ENCOUNTER — LAB (OUTPATIENT)
Dept: LAB | Facility: LAB | Age: 68
End: 2023-10-31
Payer: MEDICARE

## 2023-10-31 DIAGNOSIS — Z00.00 ROUTINE HEALTH MAINTENANCE: ICD-10-CM

## 2023-10-31 LAB
25(OH)D3 SERPL-MCNC: 92 NG/ML (ref 30–100)
CHOLEST SERPL-MCNC: 200 MG/DL (ref 0–199)
CHOLESTEROL/HDL RATIO: 3.1
CRP SERPL HS-MCNC: 0.6 MG/L
EST. AVERAGE GLUCOSE BLD GHB EST-MCNC: 108 MG/DL
HBA1C MFR BLD: 5.4 %
HDLC SERPL-MCNC: 63.5 MG/DL
HYALINE CASTS #/AREA URNS AUTO: ABNORMAL /LPF
LDLC SERPL CALC-MCNC: 107 MG/DL
MUCOUS THREADS #/AREA URNS AUTO: ABNORMAL /LPF
NON HDL CHOLESTEROL: 137 MG/DL (ref 0–149)
RBC #/AREA URNS AUTO: >20 /HPF
TRIGL SERPL-MCNC: 148 MG/DL (ref 0–149)
VLDL: 30 MG/DL (ref 0–40)
WBC #/AREA URNS AUTO: ABNORMAL /HPF

## 2023-10-31 PROCEDURE — 36415 COLL VENOUS BLD VENIPUNCTURE: CPT

## 2023-10-31 PROCEDURE — 81001 URINALYSIS AUTO W/SCOPE: CPT

## 2023-10-31 PROCEDURE — 82306 VITAMIN D 25 HYDROXY: CPT

## 2023-10-31 PROCEDURE — 83036 HEMOGLOBIN GLYCOSYLATED A1C: CPT

## 2023-10-31 PROCEDURE — 86141 C-REACTIVE PROTEIN HS: CPT

## 2023-10-31 PROCEDURE — 80061 LIPID PANEL: CPT

## 2023-11-01 DIAGNOSIS — Z00.00 ROUTINE HEALTH MAINTENANCE: ICD-10-CM

## 2023-11-01 DIAGNOSIS — R31.21 ASYMPTOMATIC MICROSCOPIC HEMATURIA: Primary | ICD-10-CM

## 2023-11-02 ENCOUNTER — LAB (OUTPATIENT)
Dept: LAB | Facility: LAB | Age: 68
End: 2023-11-02
Payer: MEDICARE

## 2023-11-02 DIAGNOSIS — R31.21 ASYMPTOMATIC MICROSCOPIC HEMATURIA: ICD-10-CM

## 2023-11-02 DIAGNOSIS — Z00.00 ROUTINE HEALTH MAINTENANCE: ICD-10-CM

## 2023-11-02 LAB
APPEARANCE UR: CLEAR
BILIRUB UR STRIP.AUTO-MCNC: NEGATIVE MG/DL
CAOX CRY #/AREA UR COMP ASSIST: ABNORMAL /HPF
COLOR UR: YELLOW
GLUCOSE UR STRIP.AUTO-MCNC: NEGATIVE MG/DL
HYALINE CASTS #/AREA URNS AUTO: ABNORMAL /LPF
KETONES UR STRIP.AUTO-MCNC: NEGATIVE MG/DL
LEUKOCYTE ESTERASE UR QL STRIP.AUTO: NEGATIVE
NITRITE UR QL STRIP.AUTO: NEGATIVE
PH UR STRIP.AUTO: 6 [PH]
PROT UR STRIP.AUTO-MCNC: NEGATIVE MG/DL
RBC # UR STRIP.AUTO: ABNORMAL /UL
RBC #/AREA URNS AUTO: ABNORMAL /HPF
SP GR UR STRIP.AUTO: 1.02
UROBILINOGEN UR STRIP.AUTO-MCNC: <2 MG/DL
WBC #/AREA URNS AUTO: ABNORMAL /HPF

## 2023-11-02 PROCEDURE — 87086 URINE CULTURE/COLONY COUNT: CPT

## 2023-11-02 PROCEDURE — 81001 URINALYSIS AUTO W/SCOPE: CPT

## 2023-11-03 LAB — BACTERIA UR CULT: NORMAL

## 2023-11-07 ENCOUNTER — TELEPHONE (OUTPATIENT)
Dept: PRIMARY CARE | Facility: CLINIC | Age: 68
End: 2023-11-07
Payer: MEDICARE

## 2023-11-20 DIAGNOSIS — I10 ESSENTIAL HYPERTENSION: ICD-10-CM

## 2023-11-24 RX ORDER — AMLODIPINE AND OLMESARTAN MEDOXOMIL 5; 20 MG/1; MG/1
1 TABLET ORAL DAILY
Qty: 90 TABLET | Refills: 1 | Status: SHIPPED | OUTPATIENT
Start: 2023-11-24

## 2023-12-17 DIAGNOSIS — E78.00 PURE HYPERCHOLESTEROLEMIA, UNSPECIFIED: ICD-10-CM

## 2023-12-17 RX ORDER — ALIROCUMAB 150 MG/ML
INJECTION, SOLUTION SUBCUTANEOUS
Qty: 6 PEN | Refills: 3 | Status: SHIPPED | OUTPATIENT
Start: 2023-12-17 | End: 2024-12-16

## 2023-12-18 ENCOUNTER — APPOINTMENT (OUTPATIENT)
Dept: VASCULAR SURGERY | Facility: HOSPITAL | Age: 68
End: 2023-12-18
Payer: MEDICARE

## 2023-12-24 DIAGNOSIS — U07.1 COVID-19: Primary | ICD-10-CM

## 2024-01-30 ENCOUNTER — LAB (OUTPATIENT)
Dept: LAB | Facility: LAB | Age: 69
End: 2024-01-30
Payer: MEDICARE

## 2024-01-30 DIAGNOSIS — R31.9 HEMATURIA, UNSPECIFIED TYPE: ICD-10-CM

## 2024-01-30 DIAGNOSIS — R31.9 HEMATURIA, UNSPECIFIED TYPE: Primary | ICD-10-CM

## 2024-01-30 LAB
25(OH)D3 SERPL-MCNC: 95 NG/ML (ref 30–100)
ALBUMIN SERPL BCP-MCNC: 4.5 G/DL (ref 3.4–5)
ALP SERPL-CCNC: 92 U/L (ref 33–136)
ALT SERPL W P-5'-P-CCNC: 10 U/L (ref 7–45)
ANION GAP SERPL CALC-SCNC: 13 MMOL/L (ref 10–20)
APPEARANCE UR: ABNORMAL
AST SERPL W P-5'-P-CCNC: 17 U/L (ref 9–39)
BASOPHILS # BLD AUTO: 0.04 X10*3/UL (ref 0–0.1)
BASOPHILS NFR BLD AUTO: 0.9 %
BILIRUB SERPL-MCNC: 0.5 MG/DL (ref 0–1.2)
BILIRUB UR STRIP.AUTO-MCNC: NEGATIVE MG/DL
BUN SERPL-MCNC: 19 MG/DL (ref 6–23)
CALCIUM SERPL-MCNC: 9.4 MG/DL (ref 8.6–10.3)
CAOX CRY #/AREA UR COMP ASSIST: ABNORMAL /HPF
CHLORIDE SERPL-SCNC: 104 MMOL/L (ref 98–107)
CHOLEST SERPL-MCNC: 196 MG/DL (ref 0–199)
CHOLESTEROL/HDL RATIO: 3.2
CO2 SERPL-SCNC: 26 MMOL/L (ref 21–32)
COLOR UR: ABNORMAL
CREAT SERPL-MCNC: 0.83 MG/DL (ref 0.5–1.05)
CRP SERPL HS-MCNC: 0.3 MG/L
EGFRCR SERPLBLD CKD-EPI 2021: 78 ML/MIN/1.73M*2
EOSINOPHIL # BLD AUTO: 0.11 X10*3/UL (ref 0–0.7)
EOSINOPHIL NFR BLD AUTO: 2.4 %
ERYTHROCYTE [DISTWIDTH] IN BLOOD BY AUTOMATED COUNT: 13.2 % (ref 11.5–14.5)
EST. AVERAGE GLUCOSE BLD GHB EST-MCNC: 108 MG/DL
GLUCOSE SERPL-MCNC: 83 MG/DL (ref 74–99)
GLUCOSE UR STRIP.AUTO-MCNC: NEGATIVE MG/DL
HBA1C MFR BLD: 5.4 %
HCT VFR BLD AUTO: 36.7 % (ref 36–46)
HDLC SERPL-MCNC: 61.1 MG/DL
HGB BLD-MCNC: 12.1 G/DL (ref 12–16)
HOLD SPECIMEN: NORMAL
HYALINE CASTS #/AREA URNS AUTO: ABNORMAL /LPF
IMM GRANULOCYTES # BLD AUTO: 0.02 X10*3/UL (ref 0–0.7)
IMM GRANULOCYTES NFR BLD AUTO: 0.4 % (ref 0–0.9)
KETONES UR STRIP.AUTO-MCNC: NEGATIVE MG/DL
LDLC SERPL CALC-MCNC: 109 MG/DL
LEUKOCYTE ESTERASE UR QL STRIP.AUTO: ABNORMAL
LYMPHOCYTES # BLD AUTO: 1.53 X10*3/UL (ref 1.2–4.8)
LYMPHOCYTES NFR BLD AUTO: 33.2 %
MCH RBC QN AUTO: 28.5 PG (ref 26–34)
MCHC RBC AUTO-ENTMCNC: 33 G/DL (ref 32–36)
MCV RBC AUTO: 86 FL (ref 80–100)
MONOCYTES # BLD AUTO: 0.44 X10*3/UL (ref 0.1–1)
MONOCYTES NFR BLD AUTO: 9.5 %
MUCOUS THREADS #/AREA URNS AUTO: ABNORMAL /LPF
NEUTROPHILS # BLD AUTO: 2.47 X10*3/UL (ref 1.2–7.7)
NEUTROPHILS NFR BLD AUTO: 53.6 %
NITRITE UR QL STRIP.AUTO: NEGATIVE
NON HDL CHOLESTEROL: 135 MG/DL (ref 0–149)
NRBC BLD-RTO: 0 /100 WBCS (ref 0–0)
PH UR STRIP.AUTO: 5 [PH]
PLATELET # BLD AUTO: 262 X10*3/UL (ref 150–450)
POTASSIUM SERPL-SCNC: 4.1 MMOL/L (ref 3.5–5.3)
PROT SERPL-MCNC: 6.9 G/DL (ref 6.4–8.2)
PROT UR STRIP.AUTO-MCNC: NEGATIVE MG/DL
RBC # BLD AUTO: 4.25 X10*6/UL (ref 4–5.2)
RBC # UR STRIP.AUTO: NEGATIVE /UL
RBC #/AREA URNS AUTO: ABNORMAL /HPF
SODIUM SERPL-SCNC: 139 MMOL/L (ref 136–145)
SP GR UR STRIP.AUTO: 1.02
TRIGL SERPL-MCNC: 130 MG/DL (ref 0–149)
TSH SERPL-ACNC: 2.7 MIU/L (ref 0.44–3.98)
UROBILINOGEN UR STRIP.AUTO-MCNC: <2 MG/DL
VLDL: 26 MG/DL (ref 0–40)
WBC # BLD AUTO: 4.6 X10*3/UL (ref 4.4–11.3)
WBC #/AREA URNS AUTO: ABNORMAL /HPF

## 2024-01-30 PROCEDURE — 80061 LIPID PANEL: CPT

## 2024-01-30 PROCEDURE — 80053 COMPREHEN METABOLIC PANEL: CPT

## 2024-01-30 PROCEDURE — 87086 URINE CULTURE/COLONY COUNT: CPT

## 2024-01-30 PROCEDURE — 84443 ASSAY THYROID STIM HORMONE: CPT

## 2024-01-30 PROCEDURE — 86141 C-REACTIVE PROTEIN HS: CPT

## 2024-01-30 PROCEDURE — 81001 URINALYSIS AUTO W/SCOPE: CPT

## 2024-01-30 PROCEDURE — 85025 COMPLETE CBC W/AUTO DIFF WBC: CPT

## 2024-01-30 PROCEDURE — 83036 HEMOGLOBIN GLYCOSYLATED A1C: CPT

## 2024-01-30 PROCEDURE — 82306 VITAMIN D 25 HYDROXY: CPT

## 2024-02-01 LAB — BACTERIA UR CULT: NORMAL

## 2024-02-06 ENCOUNTER — APPOINTMENT (OUTPATIENT)
Dept: PRIMARY CARE | Facility: CLINIC | Age: 69
End: 2024-02-06
Payer: MEDICARE

## 2024-02-13 DIAGNOSIS — M25.50 ARTHRALGIA, UNSPECIFIED JOINT: ICD-10-CM

## 2024-02-13 RX ORDER — DULOXETIN HYDROCHLORIDE 30 MG/1
30 CAPSULE, DELAYED RELEASE ORAL DAILY
Qty: 30 CAPSULE | Refills: 3 | Status: SHIPPED | OUTPATIENT
Start: 2024-02-13 | End: 2024-06-11

## 2024-02-14 NOTE — PROGRESS NOTES
Physical Exam    Name Stacey Davis    Date of Service :2/14/2024      Stacey Davis  68 y.o. is here for an annual physical exam  No history significant for  Hypercholesteremia and intolerance to statin therapy has been on Repatha with great success she has now seen preventative cardiology  Severe osteoporosis with T-scores less than -3 she is opted for conservative treatment density study was performed in 2020 and T-score of -3.8 in the LS spine -3.7 in the hip she is interested in seeing what her studies look like now if worse would perhaps consider treatment though she did not tolerate alendronate has not tried anything else note that her previous bone density 2016 showed very similar scores  Multiple visual issues with ptosis double vision saw neuro-ophthalmologist very concerned MRI was performed nonspecific Jay's fingers found now has prisms in her glasses  During that workup she was found to have Hashimoto's thyroiditis and has been seeing Dr. Hagen.  Nodules recommendations are for follow-up ultrasound  She had been on Cymbalta she was weaned off this and then felt very poorly so she has restarted Cymbalta are somewhat better but she still feels the Cymbalta was responsible for weight gain which she really hates.  She is having some increased aches and pains has considered increasing the dose to 60 mg  Weight gain which has been very irritating for her  She does have TMJ  Note that with her visual changes initially was worked up for the possibility of temporal arteritis but CRP and sed rate were normal  Hypertension which is a relatively new diagnosis as well  Workup in the past for connective tissue disease MAXI was positive but low titers and confirmatory testing was negative she did see rheumatology although at that point was more for the osteoporosis  Continues to not feel great but overall think she is doing okay  It has been a bit of a stressful time her brother-in-law has recently had some  legal issues which is affected the whole family  Relationship with her children is great all 3 kids and 5 grandchildren are in the [place] area which is wonderful    Past Medical History:   Diagnosis Date    Age-related osteoporosis without current pathological fracture 01/04/2023    Osteoporosis    Nasal congestion 06/01/2021    Sinus congestion    Unspecified symptoms and signs involving the genitourinary system 11/27/2020    Urinary symptom or sign       Past Surgical History:   Procedure Laterality Date    BELT ABDOMINOPLASTY  10/15/2014    Abdominoplasty    HYSTERECTOMY  10/15/2014    Supracervical Hysterectomy    MOUTH SURGERY  10/15/2014    Oral Surgery Tooth Extraction    TONSILLECTOMY  10/15/2014    Tonsillectomy With Adenoidectomy    TOTAL ABDOMINAL HYSTERECTOMY W/ BILATERAL SALPINGOOPHORECTOMY  10/15/2014    Salpingo-oophorectomy Bilateral        Social History     Tobacco Use    Smoking status: Never    Smokeless tobacco: Never   Substance Use Topics    Alcohol use: Never    Drug use: Never        Social History     Social History Narrative    Not on file       Family History   Problem Relation Name Age of Onset    Breast cancer Mother      Arthritis Father      Heart attack Father      Breast cancer Sister      Breast cancer Maternal Grandfather          There were no vitals taken for this visit.    Physical Exam  Physical examination  Reveals a well-developed woman in no acute distress    appearance is age appropriate   HEENT exam  Extraocular motion is intact  Tympanic membranes and external auditory canals are normal  Oropharynx is normal  There is no cervical lymphadenopathy appreciated  The thyroid is within normal limits    Lungs    clear to auscultation and percussion    Cardiovascular   regular rate and rhythm  No murmurs rubs or gallops are appreciated    Breast examination   No dominant masses nipple discharge or axillary lymphadenopathy is appreciated    Abdomen   soft nontender bowel  "sounds are positive   there is no organomegaly noted      Periphery  Pulses are present without deficits noted  No peripheral edema is noted    Musculoskeletal  Gait is normal  Is no joint erythema or swelling noted  Range of motion is within normal limits  Strength is 5 of 5 without deficits noted    Dermatology  No concerning skin lesions are noted    Neurology  No deficits are noted  Judgment appears appropriate  Mood and affect are appropriate                        No lab exists for component: \"LABALBU\"                       No lab exists for component: \"UAPPEAR\", \"UCOLOR\"  No components found for: \"UA\"  Lab on 01/30/2024   Component Date Value Ref Range Status    Color, Urine 01/30/2024 Joyce (N)  Straw, Yellow Final    Appearance, Urine 01/30/2024 Hazy (N)  Clear Final    Specific Gravity, Urine 01/30/2024 1.020  1.005 - 1.035 Final    pH, Urine 01/30/2024 5.0  5.0, 5.5, 6.0, 6.5, 7.0, 7.5, 8.0 Final    Protein, Urine 01/30/2024 NEGATIVE  NEGATIVE mg/dL Final    Glucose, Urine 01/30/2024 NEGATIVE  NEGATIVE mg/dL Final    Blood, Urine 01/30/2024 NEGATIVE  NEGATIVE Final    Ketones, Urine 01/30/2024 NEGATIVE  NEGATIVE mg/dL Final    Bilirubin, Urine 01/30/2024 NEGATIVE  NEGATIVE Final    Urobilinogen, Urine 01/30/2024 <2.0  <2.0 mg/dL Final    Nitrite, Urine 01/30/2024 NEGATIVE  NEGATIVE Final    Leukocyte Esterase, Urine 01/30/2024 TRACE (A)  NEGATIVE Final    Extra Tube 01/30/2024 Hold for add-ons.   Final    Auto resulted.    WBC, Urine 01/30/2024 1-5  1-5, NONE /HPF Final    RBC, Urine 01/30/2024 1-2  NONE, 1-2, 3-5 /HPF Final    Mucus, Urine 01/30/2024 1+  Reference range not established. /LPF Final    Hyaline Casts, Urine 01/30/2024 1+ (A)  NONE /LPF Final    Calcium Oxalate Crystals, Urine 01/30/2024 3+ (A)  NONE, 1+ /HPF Final    Urine Culture 01/30/2024 No significant growth   Final     [unfilled]   No results found.   The 10-year ASCVD risk score (Elroy DK, et al., 2019) is: 7.2%    Values used to " calculate the score:      Age: 68 years      Sex: Female      Is Non- : No      Diabetic: No      Tobacco smoker: No      Systolic Blood Pressure: 122 mmHg      Is BP treated: Yes      HDL Cholesterol: 61.1 mg/dL      Total Cholesterol: 196 mg/dL   .  ECG: normal sinus rhythm, no blocks or conduction defects, no ischemic changes.     Problem List Items Addressed This Visit    None      Assessment/Plan   #1 ophthalmology continued issues with double vision more than anything else very bothersome life-changing as she cannot drive at nighttime at this time I have asked her to follow-up with Dr. Fernandez if she feels it is actually gotten worse even though she has the prism she has had very complete workup  2.  Osteoporosis this is probably when I am most concerned about T-score -3.8 and -3.7 she has been reluctant resistant to treatment she did see rheumatology she would like to repeat the bone density and would consider treatment if significantly worse if stable she does not think she will she is aware that she is at increased risk of fracture  3.  Myalgias arthralgias probably more of a fibromyalgia etiology unclear certainly a little better with the Cymbalta at 30 mg she is considered increasing to 60 but she felt she gained weight at 62 at this point we will continue to follow again has had complete workup for connective tissue disease she did find medical massage to be helpful in the past I gone to counter integrative she is going to return to medical massage  4.  Hypercholesteremia markedly improved with the Repatha unfortunately is very expensive but I do recommend she continue she could not tolerate statin she has seen Dr. Baldwin had coronary artery calcium score around 15 at last check  5Low energy may be more related to depression the fibromyalgia of the double vision the combination of things lab work all acceptable  6.  Health maintenance  Requisition for mammogram given  Requisition  for bone density given  I have recommended Shingrix vaccination  Continue routine regular exercise  I have also recommended RSV vaccination especially that she has young grandchildren  7.  Weight gain etiology unclear she feels her diet is pretty healthy she has a good nutritional background discussed the possibility of nutritionist but she think she is okay at this point discussed increasing her protein intake  #8 hypertension under excellent control continue current regimen

## 2024-02-15 ENCOUNTER — OFFICE VISIT (OUTPATIENT)
Dept: PRIMARY CARE | Facility: CLINIC | Age: 69
End: 2024-02-15
Payer: MEDICARE

## 2024-02-15 VITALS
BODY MASS INDEX: 23.75 KG/M2 | DIASTOLIC BLOOD PRESSURE: 78 MMHG | HEIGHT: 60 IN | WEIGHT: 121 LBS | SYSTOLIC BLOOD PRESSURE: 118 MMHG

## 2024-02-15 DIAGNOSIS — H53.2 DOUBLE VISION: ICD-10-CM

## 2024-02-15 DIAGNOSIS — M81.8 OTHER OSTEOPOROSIS WITHOUT CURRENT PATHOLOGICAL FRACTURE: ICD-10-CM

## 2024-02-15 DIAGNOSIS — Z12.31 ENCOUNTER FOR SCREENING MAMMOGRAM FOR MALIGNANT NEOPLASM OF BREAST: Primary | ICD-10-CM

## 2024-02-15 DIAGNOSIS — R53.82 CHRONIC FATIGUE: ICD-10-CM

## 2024-02-15 DIAGNOSIS — I10 ESSENTIAL HYPERTENSION: ICD-10-CM

## 2024-02-15 DIAGNOSIS — M79.10 MYALGIA: ICD-10-CM

## 2024-02-15 DIAGNOSIS — E78.00 HYPERCHOLESTEREMIA: ICD-10-CM

## 2024-02-15 PROBLEM — H81.10 BENIGN POSITIONAL VERTIGO: Status: RESOLVED | Noted: 2023-01-25 | Resolved: 2024-02-15

## 2024-02-15 PROBLEM — R00.2 PALPITATIONS: Status: RESOLVED | Noted: 2023-01-25 | Resolved: 2024-02-15

## 2024-02-15 PROBLEM — M16.9 OSTEOARTHRITIS OF HIP: Status: RESOLVED | Noted: 2023-01-25 | Resolved: 2024-02-15

## 2024-02-15 PROBLEM — R07.89 ATYPICAL CHEST PAIN: Status: RESOLVED | Noted: 2023-01-25 | Resolved: 2024-02-15

## 2024-02-15 PROBLEM — R53.83 FATIGUE: Status: RESOLVED | Noted: 2023-01-25 | Resolved: 2024-02-15

## 2024-02-15 PROBLEM — R53.1 WEAKNESS GENERALIZED: Status: RESOLVED | Noted: 2023-01-25 | Resolved: 2024-02-15

## 2024-02-15 PROBLEM — R29.3 POOR POSTURE: Status: RESOLVED | Noted: 2023-01-25 | Resolved: 2024-02-15

## 2024-02-15 PROBLEM — M25.559 ARTHRALGIA OF HIP: Status: RESOLVED | Noted: 2023-01-25 | Resolved: 2024-02-15

## 2024-02-15 PROBLEM — M54.9 BACK PAIN: Status: RESOLVED | Noted: 2023-01-25 | Resolved: 2024-02-15

## 2024-02-15 PROBLEM — M54.2 NECK PAIN: Status: RESOLVED | Noted: 2023-01-25 | Resolved: 2024-02-15

## 2024-02-15 PROBLEM — R79.82 CRP ELEVATED: Status: RESOLVED | Noted: 2023-01-25 | Resolved: 2024-02-15

## 2024-02-15 PROBLEM — M54.50 LEFT-SIDED LOW BACK PAIN WITHOUT SCIATICA: Status: RESOLVED | Noted: 2023-01-25 | Resolved: 2024-02-15

## 2024-02-15 PROCEDURE — UHSPHYS PR UH SELECT PHYSICAL: Performed by: INTERNAL MEDICINE

## 2024-02-15 PROCEDURE — 1159F MED LIST DOCD IN RCRD: CPT | Performed by: INTERNAL MEDICINE

## 2024-02-15 PROCEDURE — 1160F RVW MEDS BY RX/DR IN RCRD: CPT | Performed by: INTERNAL MEDICINE

## 2024-02-15 PROCEDURE — 93000 ELECTROCARDIOGRAM COMPLETE: CPT | Performed by: INTERNAL MEDICINE

## 2024-02-15 PROCEDURE — 1036F TOBACCO NON-USER: CPT | Performed by: INTERNAL MEDICINE

## 2024-02-15 PROCEDURE — 1126F AMNT PAIN NOTED NONE PRSNT: CPT | Performed by: INTERNAL MEDICINE

## 2024-02-15 PROCEDURE — 3074F SYST BP LT 130 MM HG: CPT | Performed by: INTERNAL MEDICINE

## 2024-02-15 PROCEDURE — 3078F DIAST BP <80 MM HG: CPT | Performed by: INTERNAL MEDICINE

## 2024-02-15 NOTE — PATIENT INSTRUCTIONS
It was good to see you.  You are doing a good job with your overall health care. I encourage you to stay active and healthy, and to follow these healthy habits:   Blood Work is all very acceptable and you are doing well with your current regimen unfortunately I know the Repatha is so expensive  I am probably most concerned about your bone density.  However as we discussed if you do not feel you would take medications there would be no reason to recheck it but if you would like to recheck the bone density and if it is progressed you would consider treatment I think it is worthwhile.  At this time you are doing everything nonpharmacologically to treat the osteoporosis  Continue close follow-up with ophthalmology and I would definitely call Dr. Fernandez to see if you can get in sooner since you feel your symptoms are worse  Mammogram requisition has been placed  Could consider increasing Cymbalta if needed for the pain      Shingrix or shingles vaccination is recommended. This is a series of 2 vaccinations. The second vaccination is given 2-6 months following the first.     Try to increase your intake of fish such as salmon and tuna and try to get 2 to 3 servings of fish per week.  Increase your intake of plant-based protein listed here:    Unprocessed nuts, walnuts, or almonds, Nuts and Seeds.      Green vegetables such as Broccoli, Peas, Greens, Spinach    Beans, Chickpeas, & Lentils    Other sources include Potatoes, Quinoa, Seaweed, Soymilk, Tempeh, and Tofu.  Increase food s higher in flavonoids found in black tea, green tea, apples, nuts, citrus fruit, berries, and dark chocolate.  You should avoid fried foods, and sugary or starchy foods and sugary drinks, and avoid saturated fats.    Try not to dine at restaurants frequently and avoid fast food restaurants.      Try to get restful sleep approximately 7-9 hours every night.  Work towards getting 30 minutes of  moderate intensity exercise 4 to 5 days per week.  You  should also try to exercise at least one hour per day with light walking.

## 2024-02-19 ENCOUNTER — APPOINTMENT (OUTPATIENT)
Dept: NEUROLOGY | Facility: CLINIC | Age: 69
End: 2024-02-19
Payer: MEDICARE

## 2024-03-05 ENCOUNTER — HOSPITAL ENCOUNTER (OUTPATIENT)
Dept: RADIOLOGY | Facility: HOSPITAL | Age: 69
Discharge: HOME | End: 2024-03-05
Payer: MEDICARE

## 2024-03-05 DIAGNOSIS — M81.8 OTHER OSTEOPOROSIS WITHOUT CURRENT PATHOLOGICAL FRACTURE: ICD-10-CM

## 2024-03-05 PROCEDURE — 77080 DXA BONE DENSITY AXIAL: CPT | Performed by: STUDENT IN AN ORGANIZED HEALTH CARE EDUCATION/TRAINING PROGRAM

## 2024-03-05 PROCEDURE — 77080 DXA BONE DENSITY AXIAL: CPT

## 2024-03-27 ENCOUNTER — TELEPHONE (OUTPATIENT)
Dept: PRIMARY CARE | Facility: CLINIC | Age: 69
End: 2024-03-27
Payer: MEDICARE

## 2024-03-27 NOTE — TELEPHONE ENCOUNTER
SHARIFA: Spoke with pt about her BMD results she is aware you would like her to follow up with Rheumatology

## 2024-04-09 ENCOUNTER — OFFICE VISIT (OUTPATIENT)
Dept: OPHTHALMOLOGY | Facility: CLINIC | Age: 69
End: 2024-04-09
Payer: MEDICARE

## 2024-04-09 DIAGNOSIS — H53.2 DOUBLE VISION: Primary | ICD-10-CM

## 2024-04-09 PROCEDURE — 1159F MED LIST DOCD IN RCRD: CPT | Performed by: PSYCHIATRY & NEUROLOGY

## 2024-04-09 PROCEDURE — 99214 OFFICE O/P EST MOD 30 MIN: CPT | Performed by: PSYCHIATRY & NEUROLOGY

## 2024-04-09 PROCEDURE — 1160F RVW MEDS BY RX/DR IN RCRD: CPT | Performed by: PSYCHIATRY & NEUROLOGY

## 2024-04-09 ASSESSMENT — ENCOUNTER SYMPTOMS
GASTROINTESTINAL NEGATIVE: 0
RESPIRATORY NEGATIVE: 0
CONSTITUTIONAL NEGATIVE: 0
CARDIOVASCULAR NEGATIVE: 0
MUSCULOSKELETAL NEGATIVE: 0
HEMATOLOGIC/LYMPHATIC NEGATIVE: 0
PSYCHIATRIC NEGATIVE: 0
ALLERGIC/IMMUNOLOGIC NEGATIVE: 0
EYES NEGATIVE: 1
ENDOCRINE NEGATIVE: 0
NEUROLOGICAL NEGATIVE: 0

## 2024-04-09 ASSESSMENT — CUP TO DISC RATIO
OD_RATIO: .4
OS_RATIO: .4

## 2024-04-09 ASSESSMENT — TONOMETRY
IOP_METHOD: GOLDMANN APPLANATION
OS_IOP_MMHG: 12
OD_IOP_MMHG: 12

## 2024-04-09 ASSESSMENT — CONF VISUAL FIELD
OS_SUPERIOR_NASAL_RESTRICTION: 0
OD_SUPERIOR_NASAL_RESTRICTION: 0
OD_NORMAL: 1
OS_SUPERIOR_TEMPORAL_RESTRICTION: 0
OD_INFERIOR_TEMPORAL_RESTRICTION: 0
OS_INFERIOR_NASAL_RESTRICTION: 0
OS_INFERIOR_TEMPORAL_RESTRICTION: 0
OD_SUPERIOR_TEMPORAL_RESTRICTION: 0
OD_INFERIOR_NASAL_RESTRICTION: 0
OS_NORMAL: 1

## 2024-04-09 ASSESSMENT — REFRACTION_FINALRX
OS_HBASE: OUT
OS_HPRISM: 6

## 2024-04-09 ASSESSMENT — EXTERNAL EXAM - RIGHT EYE: OD_EXAM: NORMAL

## 2024-04-09 ASSESSMENT — VISUAL ACUITY
OS_CC: 20/25
CORRECTION_TYPE: GLASSES
OD_CC: 20/20
METHOD: SNELLEN - LINEAR

## 2024-04-09 ASSESSMENT — EXTERNAL EXAM - LEFT EYE: OS_EXAM: NORMAL

## 2024-04-09 ASSESSMENT — SLIT LAMP EXAM - LIDS
COMMENTS: NORMAL
COMMENTS: NORMAL

## 2024-04-09 NOTE — PROGRESS NOTES
Assessment and Plan    05/20/2023 MRI brain with contrast, which I personally reviewed previously, shows bihemispheric FLAIR white matter lesions, primarily deep white matter.    06/26/2023 thyroid peroxidase antibody >1000. Acetylcholine receptor binding, blocking & modulating antibodies, thyroid stimulating immunoglobulin, thyrotropin receptor antibody negative.  01/02/2020 ESR 7. RF <10. MAXI 1:40 with negative CALEB.  07/22/2016 ESR 5. CRP <0.10 mg/dL. MAXI 1:80 with negative CALEB.  08/17/2012 CRP <0.30 mg/dL. MAXI 1:80 with negative CALEB.    This 68 year-old woman with a history of HTN, HLD presents in follow up for evaluation of diplopia and ptosis, previously with esotropia.    Examination shows continued esotropia, probably with some progression given wearing prism still with diplopia. I still think she has sagging eye with progression, I fit a Fresnel today as a temporizing measure. I suspect she will stabilize, but has not yet. There is not enough change to think it is thyroid eye disease or ocular myasthenia, and prior imaging was negative.    Plan    Trial of Fresnel 6 PD base-out prism (DARREL) OS.  Prism and head turning.    Follow up in 6 months with stereo plates, sooner if worsening. (dilated 06/19/2023)

## 2024-04-22 ENCOUNTER — HOSPITAL ENCOUNTER (OUTPATIENT)
Dept: RADIOLOGY | Facility: HOSPITAL | Age: 69
Discharge: HOME | End: 2024-04-22
Payer: MEDICARE

## 2024-04-22 VITALS — WEIGHT: 121 LBS | BODY MASS INDEX: 22.26 KG/M2 | HEIGHT: 62 IN

## 2024-04-22 DIAGNOSIS — Z12.31 ENCOUNTER FOR SCREENING MAMMOGRAM FOR MALIGNANT NEOPLASM OF BREAST: ICD-10-CM

## 2024-04-22 PROCEDURE — 77063 BREAST TOMOSYNTHESIS BI: CPT | Performed by: RADIOLOGY

## 2024-04-22 PROCEDURE — 77067 SCR MAMMO BI INCL CAD: CPT | Performed by: RADIOLOGY

## 2024-04-22 PROCEDURE — 77067 SCR MAMMO BI INCL CAD: CPT

## 2024-04-29 ENCOUNTER — TELEPHONE (OUTPATIENT)
Dept: PRIMARY CARE | Facility: CLINIC | Age: 69
End: 2024-04-29
Payer: MEDICARE

## 2024-04-29 DIAGNOSIS — R63.5 WEIGHT GAIN, ABNORMAL: Primary | ICD-10-CM

## 2024-04-29 NOTE — TELEPHONE ENCOUNTER
She has gained 15lbs in the last year. Her breast are huge and bigger then they were when she was pregnant and her back hurts so bad. She is watching what she eats. Can you order bw?

## 2024-05-01 ENCOUNTER — LAB (OUTPATIENT)
Dept: LAB | Facility: LAB | Age: 69
End: 2024-05-01
Payer: MEDICARE

## 2024-05-01 DIAGNOSIS — R63.5 WEIGHT GAIN, ABNORMAL: ICD-10-CM

## 2024-05-01 LAB
ALBUMIN SERPL BCP-MCNC: 4.4 G/DL (ref 3.4–5)
ALP SERPL-CCNC: 101 U/L (ref 33–136)
ALT SERPL W P-5'-P-CCNC: 14 U/L (ref 7–45)
ANION GAP SERPL CALC-SCNC: 13 MMOL/L (ref 10–20)
AST SERPL W P-5'-P-CCNC: 18 U/L (ref 9–39)
BILIRUB SERPL-MCNC: 0.7 MG/DL (ref 0–1.2)
BUN SERPL-MCNC: 23 MG/DL (ref 6–23)
CALCIUM SERPL-MCNC: 9.5 MG/DL (ref 8.6–10.3)
CHLORIDE SERPL-SCNC: 104 MMOL/L (ref 98–107)
CO2 SERPL-SCNC: 26 MMOL/L (ref 21–32)
CREAT SERPL-MCNC: 1 MG/DL (ref 0.5–1.05)
EGFRCR SERPLBLD CKD-EPI 2021: 62 ML/MIN/1.73M*2
GLUCOSE SERPL-MCNC: 88 MG/DL (ref 74–99)
POTASSIUM SERPL-SCNC: 4.4 MMOL/L (ref 3.5–5.3)
PROT SERPL-MCNC: 7.1 G/DL (ref 6.4–8.2)
SODIUM SERPL-SCNC: 139 MMOL/L (ref 136–145)
TSH SERPL-ACNC: 2.14 MIU/L (ref 0.44–3.98)

## 2024-05-01 PROCEDURE — 80053 COMPREHEN METABOLIC PANEL: CPT

## 2024-05-01 PROCEDURE — 36415 COLL VENOUS BLD VENIPUNCTURE: CPT

## 2024-05-01 PROCEDURE — 84443 ASSAY THYROID STIM HORMONE: CPT

## 2024-05-06 NOTE — PROGRESS NOTES
Subjective   Patient ID: Stacey Davis is a 68 y.o. female.    HPI  Patient presents today in follow-up with continued issues with her weight and inability to lose weight she is very frustrated  Her vision  is what is driving her the most crazy  she is getting dizzy and nauseated and is eating more crackers etc   She has gained weight very frustrating for her as she is always been very thin she finds it is more central which really drives her crazy as well      Past medical history  Hypercholesteremia and intolerance to statin therapy has been on Repatha with great success she has now seen preventative cardiology  Severe osteoporosis with T-scores less than -3 she is opted for conservative treatment density study was performed in 2020 and T-score of -3.8 in the LS spine -3.7 in the hip she is interested in seeing what her studies look like now if worse would perhaps consider treatment though she did not tolerate alendronate has not tried anything else note that her previous bone density 2016 showed very similar scores  Multiple visual issues with ptosis double vision saw neuro-ophthalmologist very concerned MRI was performed nonspecific Jay's fingers found now has prisms in her glasses  During that workup she was found to have Hashimoto's thyroiditis and has been seeing Dr. Hagen.  Nodules recommendations are for follow-up ultrasound  She had been on Cymbalta she was weaned off this and then felt very poorly so she has restarted Cymbalta are somewhat better but she still feels the Cymbalta was partially responsible for weight gain which she really hates.  But she does feel it is working well for her depression and anxiety    Review of Systems    Objective   Physical Exam  Developed no acute distress  HEENT exam is unremarkable she does have prisms on glasses  Lungs are clear to auscultation percussion  Cardiovascular regular rate and rhythm  She does have weight gain in the middle of her body but note her BMI  remains within a normal range  Periphery is without edema  Assessment/Plan     #1 weight gain in retrospect she really does not think she has probably been eating too many carbohydrates as she feels nauseous because of the dizziness because of the double vision she is very frustrated by all the visual issues she is working routinely with neuro-ophthalmology trying to be patient as the thought is that she is going to take time for her to get used to her prisms however in regards to the nausea working to try Zofran to see if this helps her nausea and then hopefully she will not have to eat so many carbs to counteract the nausea I would like her to come in and see nutritionist as well for consultation again note her BMI is actually normal I do not think her protein intake is probably that could  2.  Nausea again we will try the Zofran she really feels the nausea is from the double vision  3.  Depression anxiety Cymbalta has been very helpful she however feels very frustrated with her current medical conditions and her weight gain talked about counseling which not sure she wants to do at this time feels that she has enough support systems especially with her sisters  4  hypertension good control continue current regimen  20 minutes were spent with patient of which greater than 50% was spent in counseling and coordination of care  This note was partially generated using the Dragon voice recognition system.  There may be some incorrect wording ,grammar, spelling or punctuation errors that were not corrected prior to committing the note to the medical record.

## 2024-05-07 ENCOUNTER — OFFICE VISIT (OUTPATIENT)
Dept: PRIMARY CARE | Facility: CLINIC | Age: 69
End: 2024-05-07
Payer: MEDICARE

## 2024-05-07 VITALS — DIASTOLIC BLOOD PRESSURE: 74 MMHG | BODY MASS INDEX: 22.13 KG/M2 | SYSTOLIC BLOOD PRESSURE: 122 MMHG | WEIGHT: 121 LBS

## 2024-05-07 DIAGNOSIS — Z00.00 ROUTINE HEALTH MAINTENANCE: ICD-10-CM

## 2024-05-07 DIAGNOSIS — R11.0 NAUSEA: Primary | ICD-10-CM

## 2024-05-07 DIAGNOSIS — I10 ESSENTIAL HYPERTENSION: ICD-10-CM

## 2024-05-07 DIAGNOSIS — H53.2 DOUBLE VISION: ICD-10-CM

## 2024-05-07 PROCEDURE — 3078F DIAST BP <80 MM HG: CPT | Performed by: INTERNAL MEDICINE

## 2024-05-07 PROCEDURE — 99213 OFFICE O/P EST LOW 20 MIN: CPT | Performed by: INTERNAL MEDICINE

## 2024-05-07 PROCEDURE — 1160F RVW MEDS BY RX/DR IN RCRD: CPT | Performed by: INTERNAL MEDICINE

## 2024-05-07 PROCEDURE — 1036F TOBACCO NON-USER: CPT | Performed by: INTERNAL MEDICINE

## 2024-05-07 PROCEDURE — 3074F SYST BP LT 130 MM HG: CPT | Performed by: INTERNAL MEDICINE

## 2024-05-07 PROCEDURE — 1159F MED LIST DOCD IN RCRD: CPT | Performed by: INTERNAL MEDICINE

## 2024-05-07 RX ORDER — ONDANSETRON 4 MG/1
4 TABLET, ORALLY DISINTEGRATING ORAL EVERY 8 HOURS PRN
Qty: 20 TABLET | Refills: 1 | Status: SHIPPED | OUTPATIENT
Start: 2024-05-07

## 2024-05-13 ENCOUNTER — NUTRITION (OUTPATIENT)
Dept: PRIMARY CARE | Facility: CLINIC | Age: 69
End: 2024-05-13
Payer: MEDICARE

## 2024-05-13 DIAGNOSIS — Z00.00 ROUTINE HEALTH MAINTENANCE: ICD-10-CM

## 2024-05-13 PROCEDURE — NUTCO NUTRITION CONSULTATION: Performed by: DIETITIAN, REGISTERED

## 2024-05-13 NOTE — PROGRESS NOTES
Reason for Nutrition Visit:  It was a pleasure meeting Ms. Davis today to discuss diet and nutrition as part of the  [facility] program.    1. Routine health maintenance  Referral to Nutrition Services           Medication Documentation Review Audit       Reviewed by Asia Bob MD (Physician Assistant) on 05/07/24 at 1203      Medication Order Taking? Sig Documenting Provider Last Dose Status   amlodipine-olmesartan (Helen) 5-20 mg tablet [id number] Yes TAKE 1 TABLET BY MOUTH EVERY DAY Asia Bob MD Taking Active   cholecalciferol, vitamin D3, (VITAMIN D3 ORAL) [id number] Yes  Historical Provider, MD Taking Active   cycloSPORINE (Restasis) 0.05 % ophthalmic emulsion [id number] Yes Administer 1 drop into both eyes 2 times a day. Historical Provider, MD Taking Active   DULoxetine (Cymbalta) 30 mg DR capsule [id number] Yes TAKE 1 CAPSULE (30 MG) BY MOUTH ONCE DAILY. DO NOT CRUSH OR CHEW. Asia Bob MD Taking Active   ondansetron ODT (Zofran-ODT) 4 mg disintegrating tablet [id number]  Take 1 tablet (4 mg) by mouth every 8 hours if needed for nausea or vomiting. Asia Bob MD  Active   Praluent Pen 150 mg/mL pen injector [id number] Yes INJECT 150 MG (ONE PEN) UNDER THE SKIN ONCE EVERY TWO WEEKS. Lopez Baldwin MD Taking Active                     Past Medical Hx:  Patient Active Problem List   Diagnosis    Abnormal antinuclear antibody titer    Anxiety    Arthralgia    Hypercholesteremia    Thyroid nodule    Myalgia    Osteoporosis    Scoliosis    Stenosis, cervical spine    TMJ syndrome    Visual changes    Essential hypertension    Double vision        Weight change:  She reports a 10lb weight gain in the past few months.  Her usual weight is 110-115lbs.  Her current weight is 121lbs.  Significant Weight Change: No    Lab Results   Component Value Date    HGBA1C 5.4 01/30/2024    CHOL 196 01/30/2024    LDLCALC 109 (H) 01/30/2024    TRIG 130 01/30/2024    HDL 61.1 01/30/2024     [facility]        Food and Nutrition Hx:  She reports that her eating habits haven't changed but she notices weight gain particularly in her abdomen.  It is just she and her  at home and she doesn't enjoy cooking as much as she used to.  She frequently buys [employer] prepared foods, including sushi.    She reports being a \"grazer\".  She is not eating balanced meals frequently.  She doesn't eat red meat.  She is eating fish 3 times a week.  She also eats chicken and turkey and eggs.      Food Recall:  Breakfast (8-9am): protein granola, Ensure  Lunch: salad-lettuce, cucumber, house salad dressing, tomatoes; 1/2 slice cheesecake  Snack: popcorn  Dinner: sushi-tuna and salmon rolls    Breakfast: coffee, banana  Lunch: sushi (1/2 from [employer]); blackberries with fat-free pussing  Snack: radishes, berries and grapes  Snack: cottage cheese  Dinner: Greek chicken collin with tomatoes, lettuce    Breakfast: 1 egg, coffee, banana  Lunch: Greek chicken collin  Snack: peanut butter cookie, banana  Dinner: tuna sushi    Breakfast: gluten free toast (1 slice), 1 egg, green tea  Lunch: cottage cheese, collin chips  Snack: banana, collin chips  Dinner: Garcia salad with chicken    Beverages: water-24oz a day; coffee-1 cup a day with [place] sugar-free creamer; green tea-a few times a week with Stevia; sometimes bone broth; alcohol-none    Allergies: None  Intolerance: Lactose    GI Symptoms : increased gas, bloating, and constipation Frequency: intermittent  She only has about one bowel movement a week..    Exercise: She walks everyday, at least 60 minutes; pilates on reformer 3-4 times a week, 60 minutes    Sleep duration/quality : goes to bed 7:30pm and wakes 6:30am.  She feels rested.  She wears an [place] ring and her sleep score is usually in the 80s.      Supplements: Vitamin D daily    Cravings: Sweet, carbs  Energy Levels: High      Estimated Nutrient Needs:    Calories for Weight Maintenance: 1460 ([address] " [place] x 1.4 activity factor)  Calories for Weight Loss: 1200  Protein Needs: 100g/day (0.8g/lb)    Nutrition Diagnosis:    Diagnosis Statement 1:  Diagnosis Status: New  Diagnosis : Altered GI function  related to alteration in gastrointestinal tract structure and/or function as evidenced by  chronic constipation    Diagnosis Statement 2:  Diagnosis Status: New  Diagnosis : Inadequate fiber intake  related to food and nutrition related knowledge deficit concerning desirable quantities of fiber as evidenced by  food recall showing she is not meeting the recommended 30g fiber per day    Diagnosis Statement 3:   Diagnosis Status: New  Diagnosis : Inadequate protein intake  related to  food- and nutrition-related knowledge deficit  as evidenced by  food recall showing she is not meeting the recommended 100g protein per day    Nutrition Interventions:  Anti-Inflammatory Diet, Gluten Free Diet, Increased Fiber Diet, and Increased Protein Diet      Nutrition Goals:  Nutrition Goals : Blood glucose control  Consume prescribed supplements  Reduce Hgb A1c  Weight Loss  Adequate protein intake  Adequate fiber intake    Nutrition Recommendations:    1) To help create well balanced meals while being mindful of portion sizes, use the plate model for portioning out your meals.  Fill 1/2 of your plate with non-starchy vegetables, 1/4 of your plate with lean protein (~4-5oz per meal), and 1/4 of your plate with complex carbohydrates/whole grains.  Each meal should contain the following 3 components: protein + fiber + healthy fats.    2) Aim for 30g fiber daily.  One way to help you reach this goal is to include non-starchy vegetables with both lunch and dinner (at least 1-2 cups).  Be sure to include a wide variety of colorful vegetables throughout the week.  Also, be sure to use 100% whole wheat/whole grain breads/pastas, brown/wild rice, quinoa, oats, beans, lentils, starchy vegetables-potatoes, sweet potatoes, corn, peas, winter  squash and limit/avoid white, processed carbohydrates (white bread, white pasta, white rice, etc).    3) Choose 3 out of 5 of the following daily to help you reach your daily fiber goals:  -1 cup berries (4-5g fiber)  -1/2 cup beans (7g fiber)  -1/4 cup nuts (5g fiber)  -1/3 avocado (4g fiber)  -3 cups greens (5g fiber)    4) Ensure you are getting adequate amounts of protein consistently throughout the day.  Your daily protein goal is 100g.  Aim for 30g per meal and include 5-10g protein at snacks.    5) Options for breakfast to help you increase protein and fiber at this meal:  -OWYN pre-made protein drink + berries  -[employer] or [place] high protein dairy-free yogurt + [place] Crunch cereal + nuts/seeds + berries  -oatmeal + protein powder (Truvani or OWYN) + nuts/seeds + berries  -2 eggs + chicken sausage ([employer]) + avocado    6) Healthy snacks should be a combination of protein and high fiber complex carbohydrates.  Examples:  -high quality protein bar-RX, Aloha, No Cow  -nuts or natural nut butter + fruit  -hummus + veggies  -jerky (Chomps, EPIC) + fruit  -[place] Crunch cereal    7) With autoimmune conditions such as Hashimotos thyroiditis, I would recommend a gluten-free, dairy-free diet/lifestyle.    8) Recommended supplements to help with promoting more frequent bowel movements:  -drink jina tea 1-2 times per day  -Pure Encapsulations magnesium glycinate, 4-5 capsules at night  -[employer] Digestzymes, take 1 capsule with each meal

## 2024-05-15 NOTE — PROGRESS NOTES
Assessment and Plan    05/20/2023 MRI brain with contrast, which I personally reviewed previously, shows bihemispheric FLAIR white matter lesions, primarily deep white matter.    06/26/2023 thyroid peroxidase antibody >1000. Acetylcholine receptor binding, blocking & modulating antibodies, thyroid stimulating immunoglobulin, thyrotropin receptor antibody negative.  01/02/2020 ESR 7. RF <10. MAXI 1:40 with negative CALEB.  07/22/2016 ESR 5. CRP <0.10 mg/dL. MAXI 1:80 with negative CALEB.  08/17/2012 CRP <0.30 mg/dL. MAXI 1:80 with negative CALEB.    This 68 year-old woman with a history of HTN, HLD presents in follow up for evaluation of diplopia and ptosis, previously with esotropia.    The examination  shows continued stable esotropia over the short interval, but she is not tolerating the Fresnel. I recommend a trial of a 5 PD base-out prism (DRAREL) OS prism. 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    Plan    Trial of changing to Fresnel 5 PD base-out prism (DARREL) OS from Fresnel 6 PD base-out prism (DARREL) OS.  Prism and head turning.    Follow up in 6 months with stereo plates, sooner if worsening. (dilated 06/19/2023)

## 2024-05-16 ENCOUNTER — OFFICE VISIT (OUTPATIENT)
Dept: OPHTHALMOLOGY | Facility: CLINIC | Age: 69
End: 2024-05-16
Payer: MEDICARE

## 2024-05-16 DIAGNOSIS — H53.2 DOUBLE VISION: Primary | ICD-10-CM

## 2024-05-16 PROCEDURE — 99213 OFFICE O/P EST LOW 20 MIN: CPT | Performed by: PSYCHIATRY & NEUROLOGY

## 2024-05-16 PROCEDURE — 99070 SPECIAL SUPPLIES PHYS/QHP: CPT | Performed by: PSYCHIATRY & NEUROLOGY

## 2024-05-16 PROCEDURE — 92060 SENSORIMOTOR EXAMINATION: CPT | Performed by: PSYCHIATRY & NEUROLOGY

## 2024-05-16 RX ORDER — METHYLDOPA/HYDROCHLOROTHIAZIDE 250MG-15MG
TABLET ORAL
COMMUNITY

## 2024-05-16 ASSESSMENT — REFRACTION_FINALRX: OS_HBASE: OUT

## 2024-05-16 ASSESSMENT — CUP TO DISC RATIO
OS_RATIO: .4
OD_RATIO: .4

## 2024-05-16 ASSESSMENT — TONOMETRY
IOP_METHOD: GOLDMANN APPLANATION
OD_IOP_MMHG: 16
OS_IOP_MMHG: 16

## 2024-05-16 ASSESSMENT — CONF VISUAL FIELD
OS_INFERIOR_NASAL_RESTRICTION: 0
OD_INFERIOR_NASAL_RESTRICTION: 0
OS_NORMAL: 1
OD_SUPERIOR_TEMPORAL_RESTRICTION: 0
METHOD: COUNTING FINGERS
OD_INFERIOR_TEMPORAL_RESTRICTION: 0
OS_SUPERIOR_NASAL_RESTRICTION: 0
OS_INFERIOR_TEMPORAL_RESTRICTION: 0
OD_SUPERIOR_NASAL_RESTRICTION: 0
OD_NORMAL: 1
OS_SUPERIOR_TEMPORAL_RESTRICTION: 0

## 2024-05-16 ASSESSMENT — ENCOUNTER SYMPTOMS
PSYCHIATRIC NEGATIVE: 0
ENDOCRINE NEGATIVE: 0
MUSCULOSKELETAL NEGATIVE: 0
CONSTITUTIONAL NEGATIVE: 0
HEMATOLOGIC/LYMPHATIC NEGATIVE: 0
RESPIRATORY NEGATIVE: 0
GASTROINTESTINAL NEGATIVE: 0
NEUROLOGICAL NEGATIVE: 0
EYES NEGATIVE: 1
CARDIOVASCULAR NEGATIVE: 0
ALLERGIC/IMMUNOLOGIC NEGATIVE: 0

## 2024-05-16 ASSESSMENT — SLIT LAMP EXAM - LIDS
COMMENTS: NORMAL
COMMENTS: NORMAL

## 2024-05-16 ASSESSMENT — VISUAL ACUITY
CORRECTION_TYPE: GLASSES
OS_PH_CC+: -3
OD_CC: 20/20
METHOD: SNELLEN - LINEAR

## 2024-05-16 ASSESSMENT — EXTERNAL EXAM - LEFT EYE: OS_EXAM: NORMAL

## 2024-05-16 ASSESSMENT — EXTERNAL EXAM - RIGHT EYE: OD_EXAM: NORMAL

## 2024-06-11 DIAGNOSIS — M25.50 ARTHRALGIA, UNSPECIFIED JOINT: ICD-10-CM

## 2024-06-11 RX ORDER — DULOXETIN HYDROCHLORIDE 30 MG/1
30 CAPSULE, DELAYED RELEASE ORAL DAILY
Qty: 90 CAPSULE | Refills: 2 | Status: SHIPPED | OUTPATIENT
Start: 2024-06-11

## 2024-06-20 ENCOUNTER — APPOINTMENT (OUTPATIENT)
Dept: OPHTHALMOLOGY | Facility: CLINIC | Age: 69
End: 2024-06-20
Payer: MEDICARE

## 2024-07-09 ENCOUNTER — APPOINTMENT (OUTPATIENT)
Dept: PRIMARY CARE | Facility: CLINIC | Age: 69
End: 2024-07-09
Payer: MEDICARE

## 2024-07-24 ENCOUNTER — OFFICE VISIT (OUTPATIENT)
Dept: PRIMARY CARE | Facility: CLINIC | Age: 69
End: 2024-07-24
Payer: MEDICARE

## 2024-07-24 VITALS — DIASTOLIC BLOOD PRESSURE: 60 MMHG | BODY MASS INDEX: 22.86 KG/M2 | WEIGHT: 125 LBS | SYSTOLIC BLOOD PRESSURE: 110 MMHG

## 2024-07-24 DIAGNOSIS — I10 ESSENTIAL HYPERTENSION: ICD-10-CM

## 2024-07-24 DIAGNOSIS — R92.2 DENSE BREAST: Primary | ICD-10-CM

## 2024-07-24 DIAGNOSIS — H53.2 DOUBLE VISION: ICD-10-CM

## 2024-07-24 DIAGNOSIS — R92.30 DENSE BREAST: Primary | ICD-10-CM

## 2024-07-24 DIAGNOSIS — H53.9 VISUAL CHANGES: ICD-10-CM

## 2024-07-24 PROBLEM — M25.50 ARTHRALGIA: Status: RESOLVED | Noted: 2023-01-25 | Resolved: 2024-07-24

## 2024-07-24 PROBLEM — M79.10 MYALGIA: Status: RESOLVED | Noted: 2023-01-25 | Resolved: 2024-07-24

## 2024-07-24 PROCEDURE — 3078F DIAST BP <80 MM HG: CPT | Performed by: INTERNAL MEDICINE

## 2024-07-24 PROCEDURE — 3074F SYST BP LT 130 MM HG: CPT | Performed by: INTERNAL MEDICINE

## 2024-07-24 PROCEDURE — 99213 OFFICE O/P EST LOW 20 MIN: CPT | Performed by: INTERNAL MEDICINE

## 2024-07-24 PROCEDURE — 1159F MED LIST DOCD IN RCRD: CPT | Performed by: INTERNAL MEDICINE

## 2024-07-24 PROCEDURE — 1036F TOBACCO NON-USER: CPT | Performed by: INTERNAL MEDICINE

## 2024-07-24 PROCEDURE — 1160F RVW MEDS BY RX/DR IN RCRD: CPT | Performed by: INTERNAL MEDICINE

## 2024-07-24 NOTE — PATIENT INSTRUCTIONS
As we discussed we will obtain MRI of breast for the breast enlargement,dense breast and family history    It may be a combination of the weight gain, aging , and maybe the duloxetine

## 2024-07-24 NOTE — PROGRESS NOTES
Subjective   Patient ID: Stacey Davis is a 69 y.o. female.    HPI  Patient presents today in follow-up and also with complaints of breast pain and tenderness  She is having increased breast discomfort and breast feel very full as well    Having issues getting off the reformer at \[facility]\"" just because of the discomfort in her breast  Her breast size has increased from B to C  She has gained about 15 pounds and it is at this time.  The breast size has increased  She does self breast exam has not felt anything  Had mammogram in February which was normal  She is concerned and that she does have family history of breast cancer in grandmother mother 2 of her 3 sisters genetic testing has been negative  Past medical history significant for  Hypercholesteremia  Severe osteoporosis  Ptosis and double vision  Anxiety  Chronic pain  Has been on Cymbalta for a while.    Review of Systems    Objective   Physical Exam  Well-developed age-appropriate no acute distress  She is gaining weight which she is not overweight his BMI is 22.86  Breast examination there are no dominant masses nipple discharge or axillary lymphadenopathy appreciated  Lungs are clear to auscultation percussion  Cardiovascular regular rate and rhythm      Assessment/Plan   #1 breast enlargement etiology unclear but it may just be from the weight gain itself although again her BMI is normal she is having tenderness as well normal mammogram but strong family history of breast cancer we discussed options and I think starting with the breast self-pay MRI is warranted that she has dense breasts as well she is in agreement with this plan we did discuss that the duloxetine could also perhaps contribute though not listed in the side effects she is actually decreased the dose of this  2.  Hypertension good control continue current regimen  3.  Double vision ptosis continues to be a huge issue for she has upcoming appointment with neuro-ophthalmologist   Jim  #4 anxiety chronic pain issues really seem to be quite well controlled with the low-dose Cymbalta just 30 mg daily  25 minutes were spent with patient of which greater than 50% was spent in counseling and coordination of care  This note was partially generated using the Dragon voice recognition system.  There may be some incorrect wording ,grammar, spelling or punctuation errors that were not corrected prior to committing the note to the medical record.

## 2024-07-30 ENCOUNTER — HOSPITAL ENCOUNTER (OUTPATIENT)
Dept: RADIOLOGY | Facility: HOSPITAL | Age: 69
Discharge: HOME | End: 2024-07-30

## 2024-07-30 DIAGNOSIS — R92.30 DENSE BREAST: ICD-10-CM

## 2024-07-30 DIAGNOSIS — R92.2 DENSE BREAST: ICD-10-CM

## 2024-07-30 PROCEDURE — A9575 INJ GADOTERATE MEGLUMI 0.1ML: HCPCS | Performed by: INTERNAL MEDICINE

## 2024-07-30 PROCEDURE — 2550000001 HC RX 255 CONTRASTS: Performed by: INTERNAL MEDICINE

## 2024-07-30 PROCEDURE — 6100000003 BI MR BREAST BILATERAL WITH CONTRAST FAST SCREENING SELF PAY

## 2024-07-30 RX ORDER — GADOTERATE MEGLUMINE 376.9 MG/ML
0.2 INJECTION INTRAVENOUS
Status: COMPLETED | OUTPATIENT
Start: 2024-07-30 | End: 2024-07-30

## 2024-08-14 ENCOUNTER — APPOINTMENT (OUTPATIENT)
Dept: CARDIOLOGY | Facility: CLINIC | Age: 69
End: 2024-08-14
Payer: MEDICARE

## 2024-08-22 DIAGNOSIS — I10 ESSENTIAL HYPERTENSION: ICD-10-CM

## 2024-08-22 RX ORDER — AMLODIPINE AND OLMESARTAN MEDOXOMIL 5; 20 MG/1; MG/1
1 TABLET ORAL DAILY
Qty: 90 TABLET | Refills: 2 | Status: SHIPPED | OUTPATIENT
Start: 2024-08-22

## 2024-10-21 ENCOUNTER — APPOINTMENT (OUTPATIENT)
Dept: OPHTHALMOLOGY | Facility: CLINIC | Age: 69
End: 2024-10-21
Payer: MEDICARE

## 2024-10-21 DIAGNOSIS — H50.00 ESOTROPIA: ICD-10-CM

## 2024-10-21 DIAGNOSIS — H53.2 DOUBLE VISION: Primary | ICD-10-CM

## 2024-10-21 PROCEDURE — 99213 OFFICE O/P EST LOW 20 MIN: CPT | Performed by: PSYCHIATRY & NEUROLOGY

## 2024-10-21 PROCEDURE — 92060 SENSORIMOTOR EXAMINATION: CPT | Performed by: PSYCHIATRY & NEUROLOGY

## 2024-10-21 ASSESSMENT — CUP TO DISC RATIO
OS_RATIO: .4
OD_RATIO: .4

## 2024-10-21 ASSESSMENT — CONF VISUAL FIELD
OS_SUPERIOR_NASAL_RESTRICTION: 0
OD_INFERIOR_TEMPORAL_RESTRICTION: 0
OD_NORMAL: 1
OS_SUPERIOR_TEMPORAL_RESTRICTION: 0
OD_SUPERIOR_TEMPORAL_RESTRICTION: 0
METHOD: COUNTING FINGERS
OD_INFERIOR_NASAL_RESTRICTION: 0
OS_NORMAL: 1
OS_INFERIOR_TEMPORAL_RESTRICTION: 0
OD_SUPERIOR_NASAL_RESTRICTION: 0
OS_INFERIOR_NASAL_RESTRICTION: 0

## 2024-10-21 ASSESSMENT — ENCOUNTER SYMPTOMS
PSYCHIATRIC NEGATIVE: 0
GASTROINTESTINAL NEGATIVE: 0
EYES NEGATIVE: 1
NEUROLOGICAL NEGATIVE: 0
MUSCULOSKELETAL NEGATIVE: 0
CONSTITUTIONAL NEGATIVE: 0
ENDOCRINE NEGATIVE: 0
HEMATOLOGIC/LYMPHATIC NEGATIVE: 0
CARDIOVASCULAR NEGATIVE: 0
ALLERGIC/IMMUNOLOGIC NEGATIVE: 0
RESPIRATORY NEGATIVE: 0

## 2024-10-21 ASSESSMENT — VISUAL ACUITY
OS_PH_CC+: -1
OD_CC: 20/25
OS_CC: 20/60
METHOD: SNELLEN - LINEAR
CORRECTION_TYPE: GLASSES
OD_PH_CC: 20/20
OS_PH_CC: 20/20

## 2024-10-21 ASSESSMENT — EXTERNAL EXAM - RIGHT EYE: OD_EXAM: NORMAL

## 2024-10-21 ASSESSMENT — SLIT LAMP EXAM - LIDS
COMMENTS: NORMAL
COMMENTS: NORMAL

## 2024-10-21 ASSESSMENT — TONOMETRY
IOP_METHOD: GOLDMANN APPLANATION
OS_IOP_MMHG: 15
OD_IOP_MMHG: 15

## 2024-10-21 ASSESSMENT — EXTERNAL EXAM - LEFT EYE: OS_EXAM: NORMAL

## 2024-10-21 NOTE — PROGRESS NOTES
Assessment and Plan    05/20/2023 MRI brain with contrast, which I personally reviewed previously, shows bihemispheric FLAIR white matter lesions, primarily deep white matter.    06/26/2023 thyroid peroxidase antibody >1000. Acetylcholine receptor binding, blocking & modulating antibodies, thyroid stimulating immunoglobulin, thyrotropin receptor antibody negative.  01/02/2020 ESR 7. RF <10. MAXI 1:40 with negative CALEB.  07/22/2016 ESR 5. CRP <0.10 mg/dL. MAXI 1:80 with negative CALEB.  08/17/2012 CRP <0.30 mg/dL. MAXI 1:80 with negative CALEB.    This 69 year-old woman with a history of HTN, HLD presents in follow up for evaluation of diplopia and ptosis, previously with esotropia.    She continues to have esotropia that worsens in lateral gazes consistent with sagging eye syndrome. Such consistency over time, negative imaging and unremarkable laboratory evaluation does not show cranial nerve (CN) VI palsy, ocular myasthenia or thyroid eye disease.    At this point, we discussed options of strabismus surgery and more permanent prism along with Fresnel.    Plan    Continue Fresnel 5 PD base-out prism (DARREL) OS.  Consider 3 PD base-out prism (DARREL) with permanent prism.  Prism and head turning.    Follow up in 6-9 months with stereo plates, sooner if worsening. (dilated 06/19/2023)

## 2024-10-23 ENCOUNTER — PATIENT MESSAGE (OUTPATIENT)
Dept: OPHTHALMOLOGY | Facility: CLINIC | Age: 69
End: 2024-10-23
Payer: MEDICARE

## 2025-02-03 ENCOUNTER — HOSPITAL ENCOUNTER (INPATIENT)
Facility: HOSPITAL | Age: 70
LOS: 1 days | Discharge: HOME | DRG: 660 | End: 2025-02-05
Attending: EMERGENCY MEDICINE | Admitting: INTERNAL MEDICINE
Payer: MEDICARE

## 2025-02-03 DIAGNOSIS — K59.04 CHRONIC IDIOPATHIC CONSTIPATION: ICD-10-CM

## 2025-02-03 DIAGNOSIS — N20.0 KIDNEY STONE: ICD-10-CM

## 2025-02-03 DIAGNOSIS — N20.1 CALCULUS OF DISTAL LEFT URETER: ICD-10-CM

## 2025-02-03 DIAGNOSIS — N17.9 AKI (ACUTE KIDNEY INJURY) (CMS-HCC): ICD-10-CM

## 2025-02-03 DIAGNOSIS — R10.84 GENERALIZED ABDOMINAL PAIN: Primary | ICD-10-CM

## 2025-02-03 DIAGNOSIS — N20.0 LEFT RENAL STONE: ICD-10-CM

## 2025-02-03 LAB
ALBUMIN SERPL BCP-MCNC: 4.3 G/DL (ref 3.4–5)
ALP SERPL-CCNC: 83 U/L (ref 33–136)
ALT SERPL W P-5'-P-CCNC: 14 U/L (ref 7–45)
ANION GAP SERPL CALC-SCNC: 20 MMOL/L (ref 10–20)
AST SERPL W P-5'-P-CCNC: 20 U/L (ref 9–39)
BASOPHILS # BLD AUTO: 0.06 X10*3/UL (ref 0–0.1)
BASOPHILS NFR BLD AUTO: 0.4 %
BILIRUB SERPL-MCNC: 0.5 MG/DL (ref 0–1.2)
BUN SERPL-MCNC: 35 MG/DL (ref 6–23)
CALCIUM SERPL-MCNC: 10 MG/DL (ref 8.6–10.3)
CHLORIDE SERPL-SCNC: 100 MMOL/L (ref 98–107)
CO2 SERPL-SCNC: 20 MMOL/L (ref 21–32)
CREAT SERPL-MCNC: 1.77 MG/DL (ref 0.5–1.05)
EGFRCR SERPLBLD CKD-EPI 2021: 31 ML/MIN/1.73M*2
EOSINOPHIL # BLD AUTO: 0.03 X10*3/UL (ref 0–0.7)
EOSINOPHIL NFR BLD AUTO: 0.2 %
ERYTHROCYTE [DISTWIDTH] IN BLOOD BY AUTOMATED COUNT: 13.2 % (ref 11.5–14.5)
GLUCOSE SERPL-MCNC: 150 MG/DL (ref 74–99)
HCT VFR BLD AUTO: 35.2 % (ref 36–46)
HGB BLD-MCNC: 11.9 G/DL (ref 12–16)
IMM GRANULOCYTES # BLD AUTO: 0.07 X10*3/UL (ref 0–0.7)
IMM GRANULOCYTES NFR BLD AUTO: 0.4 % (ref 0–0.9)
LIPASE SERPL-CCNC: 29 U/L (ref 9–82)
LYMPHOCYTES # BLD AUTO: 1.4 X10*3/UL (ref 1.2–4.8)
LYMPHOCYTES NFR BLD AUTO: 8.4 %
MCH RBC QN AUTO: 29.2 PG (ref 26–34)
MCHC RBC AUTO-ENTMCNC: 33.8 G/DL (ref 32–36)
MCV RBC AUTO: 86 FL (ref 80–100)
MONOCYTES # BLD AUTO: 0.93 X10*3/UL (ref 0.1–1)
MONOCYTES NFR BLD AUTO: 5.6 %
NEUTROPHILS # BLD AUTO: 14.14 X10*3/UL (ref 1.2–7.7)
NEUTROPHILS NFR BLD AUTO: 85 %
NRBC BLD-RTO: 0 /100 WBCS (ref 0–0)
PLATELET # BLD AUTO: 295 X10*3/UL (ref 150–450)
POTASSIUM SERPL-SCNC: 3.8 MMOL/L (ref 3.5–5.3)
PROT SERPL-MCNC: 7.9 G/DL (ref 6.4–8.2)
RBC # BLD AUTO: 4.08 X10*6/UL (ref 4–5.2)
SODIUM SERPL-SCNC: 136 MMOL/L (ref 136–145)
WBC # BLD AUTO: 16.6 X10*3/UL (ref 4.4–11.3)

## 2025-02-03 PROCEDURE — 2500000004 HC RX 250 GENERAL PHARMACY W/ HCPCS (ALT 636 FOR OP/ED): Performed by: EMERGENCY MEDICINE

## 2025-02-03 PROCEDURE — 83605 ASSAY OF LACTIC ACID: CPT | Performed by: EMERGENCY MEDICINE

## 2025-02-03 PROCEDURE — 51798 US URINE CAPACITY MEASURE: CPT

## 2025-02-03 PROCEDURE — 80053 COMPREHEN METABOLIC PANEL: CPT | Performed by: EMERGENCY MEDICINE

## 2025-02-03 PROCEDURE — 83690 ASSAY OF LIPASE: CPT | Performed by: EMERGENCY MEDICINE

## 2025-02-03 PROCEDURE — 93005 ELECTROCARDIOGRAM TRACING: CPT

## 2025-02-03 PROCEDURE — 36415 COLL VENOUS BLD VENIPUNCTURE: CPT | Performed by: EMERGENCY MEDICINE

## 2025-02-03 PROCEDURE — 96374 THER/PROPH/DIAG INJ IV PUSH: CPT

## 2025-02-03 PROCEDURE — 85025 COMPLETE CBC W/AUTO DIFF WBC: CPT | Performed by: EMERGENCY MEDICINE

## 2025-02-03 PROCEDURE — 96375 TX/PRO/DX INJ NEW DRUG ADDON: CPT

## 2025-02-03 PROCEDURE — 99285 EMERGENCY DEPT VISIT HI MDM: CPT | Performed by: EMERGENCY MEDICINE

## 2025-02-03 RX ORDER — ONDANSETRON HYDROCHLORIDE 2 MG/ML
4 INJECTION, SOLUTION INTRAVENOUS ONCE
Status: COMPLETED | OUTPATIENT
Start: 2025-02-03 | End: 2025-02-03

## 2025-02-03 RX ADMIN — SODIUM CHLORIDE 1000 ML: 9 INJECTION, SOLUTION INTRAVENOUS at 23:41

## 2025-02-03 RX ADMIN — SODIUM CHLORIDE 1000 ML: 9 INJECTION, SOLUTION INTRAVENOUS at 23:15

## 2025-02-03 RX ADMIN — HYDROMORPHONE HYDROCHLORIDE 0.5 MG: 1 INJECTION, SOLUTION INTRAMUSCULAR; INTRAVENOUS; SUBCUTANEOUS at 23:37

## 2025-02-03 RX ADMIN — ONDANSETRON 4 MG: 2 INJECTION, SOLUTION INTRAMUSCULAR; INTRAVENOUS at 23:36

## 2025-02-03 ASSESSMENT — PAIN - FUNCTIONAL ASSESSMENT: PAIN_FUNCTIONAL_ASSESSMENT: 0-10

## 2025-02-03 ASSESSMENT — PAIN SCALES - GENERAL
PAINLEVEL_OUTOF10: 0 - NO PAIN
PAINLEVEL_OUTOF10: 10 - WORST POSSIBLE PAIN

## 2025-02-03 ASSESSMENT — COLUMBIA-SUICIDE SEVERITY RATING SCALE - C-SSRS
6. HAVE YOU EVER DONE ANYTHING, STARTED TO DO ANYTHING, OR PREPARED TO DO ANYTHING TO END YOUR LIFE?: NO
1. IN THE PAST MONTH, HAVE YOU WISHED YOU WERE DEAD OR WISHED YOU COULD GO TO SLEEP AND NOT WAKE UP?: NO
2. HAVE YOU ACTUALLY HAD ANY THOUGHTS OF KILLING YOURSELF?: NO

## 2025-02-04 ENCOUNTER — APPOINTMENT (OUTPATIENT)
Dept: RADIOLOGY | Facility: HOSPITAL | Age: 70
DRG: 660 | End: 2025-02-04
Payer: MEDICARE

## 2025-02-04 ENCOUNTER — ANESTHESIA EVENT (OUTPATIENT)
Dept: OPERATING ROOM | Facility: HOSPITAL | Age: 70
DRG: 660 | End: 2025-02-04
Payer: MEDICARE

## 2025-02-04 ENCOUNTER — APPOINTMENT (OUTPATIENT)
Dept: CARDIOLOGY | Facility: HOSPITAL | Age: 70
DRG: 660 | End: 2025-02-04
Payer: MEDICARE

## 2025-02-04 ENCOUNTER — ANESTHESIA (OUTPATIENT)
Dept: OPERATING ROOM | Facility: HOSPITAL | Age: 70
DRG: 660 | End: 2025-02-04
Payer: MEDICARE

## 2025-02-04 PROBLEM — R10.84 GENERALIZED ABDOMINAL PAIN: Status: ACTIVE | Noted: 2025-02-04

## 2025-02-04 LAB
ABO GROUP (TYPE) IN BLOOD: NORMAL
ALBUMIN SERPL BCP-MCNC: 3.8 G/DL (ref 3.4–5)
ANION GAP BLDV CALCULATED.4IONS-SCNC: 10 MMOL/L (ref 10–25)
ANION GAP SERPL CALC-SCNC: 13 MMOL/L (ref 10–20)
ANTIBODY SCREEN: NORMAL
APPEARANCE UR: CLEAR
APTT PPP: 29 SECONDS (ref 27–38)
ATRIAL RATE: 89 BPM
BASE EXCESS BLDV CALC-SCNC: -2.8 MMOL/L (ref -2–3)
BILIRUB UR STRIP.AUTO-MCNC: NEGATIVE MG/DL
BODY TEMPERATURE: ABNORMAL
BUN SERPL-MCNC: 28 MG/DL (ref 6–23)
CA-I BLDV-SCNC: 1.17 MMOL/L (ref 1.1–1.33)
CALCIUM SERPL-MCNC: 8.5 MG/DL (ref 8.6–10.3)
CHLORIDE BLDV-SCNC: 107 MMOL/L (ref 98–107)
CHLORIDE SERPL-SCNC: 105 MMOL/L (ref 98–107)
CO2 SERPL-SCNC: 22 MMOL/L (ref 21–32)
COLOR UR: COLORLESS
CREAT SERPL-MCNC: 1.59 MG/DL (ref 0.5–1.05)
EGFRCR SERPLBLD CKD-EPI 2021: 35 ML/MIN/1.73M*2
ERYTHROCYTE [DISTWIDTH] IN BLOOD BY AUTOMATED COUNT: 13.1 % (ref 11.5–14.5)
GLUCOSE BLDV-MCNC: 132 MG/DL (ref 74–99)
GLUCOSE SERPL-MCNC: 127 MG/DL (ref 74–99)
GLUCOSE UR STRIP.AUTO-MCNC: ABNORMAL MG/DL
HCO3 BLDV-SCNC: 23.2 MMOL/L (ref 22–26)
HCT VFR BLD AUTO: 33 % (ref 36–46)
HCT VFR BLD EST: 34 % (ref 36–46)
HGB BLD-MCNC: 10.9 G/DL (ref 12–16)
HGB BLDV-MCNC: 11.3 G/DL (ref 12–16)
HOLD SPECIMEN: NORMAL
INHALED O2 CONCENTRATION: 24 %
INR PPP: 1 (ref 0.9–1.1)
KETONES UR STRIP.AUTO-MCNC: ABNORMAL MG/DL
LACTATE BLDV-SCNC: 1.9 MMOL/L (ref 0.4–2)
LACTATE SERPL-SCNC: 1.8 MMOL/L (ref 0.4–2)
LACTATE SERPL-SCNC: 2.2 MMOL/L (ref 0.4–2)
LEUKOCYTE ESTERASE UR QL STRIP.AUTO: NEGATIVE
MAGNESIUM SERPL-MCNC: 1.89 MG/DL (ref 1.6–2.4)
MCH RBC QN AUTO: 28.8 PG (ref 26–34)
MCHC RBC AUTO-ENTMCNC: 33 G/DL (ref 32–36)
MCV RBC AUTO: 87 FL (ref 80–100)
NITRITE UR QL STRIP.AUTO: NEGATIVE
NRBC BLD-RTO: 0 /100 WBCS (ref 0–0)
OXYHGB MFR BLDV: 72.2 % (ref 45–75)
P AXIS: 72 DEGREES
P OFFSET: 191 MS
P ONSET: 142 MS
PCO2 BLDV: 44 MM HG (ref 41–51)
PH BLDV: 7.33 PH (ref 7.33–7.43)
PH UR STRIP.AUTO: 7 [PH]
PHOSPHATE SERPL-MCNC: 3.3 MG/DL (ref 2.5–4.9)
PLATELET # BLD AUTO: 204 X10*3/UL (ref 150–450)
PO2 BLDV: 43 MM HG (ref 35–45)
POTASSIUM BLDV-SCNC: 4.6 MMOL/L (ref 3.5–5.3)
POTASSIUM SERPL-SCNC: 4.4 MMOL/L (ref 3.5–5.3)
PR INTERVAL: 172 MS
PROT UR STRIP.AUTO-MCNC: NEGATIVE MG/DL
PROTHROMBIN TIME: 11 SECONDS (ref 9.8–12.8)
Q ONSET: 228 MS
QRS COUNT: 15 BEATS
QRS DURATION: 66 MS
QT INTERVAL: 372 MS
QTC CALCULATION(BAZETT): 452 MS
QTC FREDERICIA: 424 MS
R AXIS: 30 DEGREES
RBC # BLD AUTO: 3.78 X10*6/UL (ref 4–5.2)
RBC # UR STRIP.AUTO: ABNORMAL /UL
RBC #/AREA URNS AUTO: >20 /HPF
RH FACTOR (ANTIGEN D): NORMAL
SAO2 % BLDV: 74 % (ref 45–75)
SODIUM BLDV-SCNC: 136 MMOL/L (ref 136–145)
SODIUM SERPL-SCNC: 136 MMOL/L (ref 136–145)
SP GR UR STRIP.AUTO: 1.01
T AXIS: 7 DEGREES
T OFFSET: 414 MS
UROBILINOGEN UR STRIP.AUTO-MCNC: NORMAL MG/DL
VENTRICULAR RATE: 89 BPM
WBC # BLD AUTO: 11.2 X10*3/UL (ref 4.4–11.3)
WBC #/AREA URNS AUTO: ABNORMAL /HPF

## 2025-02-04 PROCEDURE — 81001 URINALYSIS AUTO W/SCOPE: CPT | Performed by: EMERGENCY MEDICINE

## 2025-02-04 PROCEDURE — 0TC78ZZ EXTIRPATION OF MATTER FROM LEFT URETER, VIA NATURAL OR ARTIFICIAL OPENING ENDOSCOPIC: ICD-10-PCS | Performed by: STUDENT IN AN ORGANIZED HEALTH CARE EDUCATION/TRAINING PROGRAM

## 2025-02-04 PROCEDURE — 2780000003 HC OR 278 NO HCPCS: Performed by: STUDENT IN AN ORGANIZED HEALTH CARE EDUCATION/TRAINING PROGRAM

## 2025-02-04 PROCEDURE — 3600000008 HC OR TIME - EACH INCREMENTAL 1 MINUTE - PROCEDURE LEVEL THREE: Performed by: STUDENT IN AN ORGANIZED HEALTH CARE EDUCATION/TRAINING PROGRAM

## 2025-02-04 PROCEDURE — 74176 CT ABD & PELVIS W/O CONTRAST: CPT

## 2025-02-04 PROCEDURE — 85730 THROMBOPLASTIN TIME PARTIAL: CPT | Performed by: INTERNAL MEDICINE

## 2025-02-04 PROCEDURE — 2500000001 HC RX 250 WO HCPCS SELF ADMINISTERED DRUGS (ALT 637 FOR MEDICARE OP)

## 2025-02-04 PROCEDURE — 3700000001 HC GENERAL ANESTHESIA TIME - INITIAL BASE CHARGE: Performed by: STUDENT IN AN ORGANIZED HEALTH CARE EDUCATION/TRAINING PROGRAM

## 2025-02-04 PROCEDURE — 2500000002 HC RX 250 W HCPCS SELF ADMINISTERED DRUGS (ALT 637 FOR MEDICARE OP, ALT 636 FOR OP/ED)

## 2025-02-04 PROCEDURE — 94760 N-INVAS EAR/PLS OXIMETRY 1: CPT

## 2025-02-04 PROCEDURE — 1200000002 HC GENERAL ROOM WITH TELEMETRY DAILY

## 2025-02-04 PROCEDURE — C2617 STENT, NON-COR, TEM W/O DEL: HCPCS | Performed by: STUDENT IN AN ORGANIZED HEALTH CARE EDUCATION/TRAINING PROGRAM

## 2025-02-04 PROCEDURE — 82365 CALCULUS SPECTROSCOPY: CPT | Performed by: STUDENT IN AN ORGANIZED HEALTH CARE EDUCATION/TRAINING PROGRAM

## 2025-02-04 PROCEDURE — 52332 CYSTOSCOPY AND TREATMENT: CPT | Performed by: STUDENT IN AN ORGANIZED HEALTH CARE EDUCATION/TRAINING PROGRAM

## 2025-02-04 PROCEDURE — 3600000003 HC OR TIME - INITIAL BASE CHARGE - PROCEDURE LEVEL THREE: Performed by: STUDENT IN AN ORGANIZED HEALTH CARE EDUCATION/TRAINING PROGRAM

## 2025-02-04 PROCEDURE — 2500000004 HC RX 250 GENERAL PHARMACY W/ HCPCS (ALT 636 FOR OP/ED): Performed by: EMERGENCY MEDICINE

## 2025-02-04 PROCEDURE — 3700000002 HC GENERAL ANESTHESIA TIME - EACH INCREMENTAL 1 MINUTE: Performed by: STUDENT IN AN ORGANIZED HEALTH CARE EDUCATION/TRAINING PROGRAM

## 2025-02-04 PROCEDURE — 99222 1ST HOSP IP/OBS MODERATE 55: CPT

## 2025-02-04 PROCEDURE — 84132 ASSAY OF SERUM POTASSIUM: CPT

## 2025-02-04 PROCEDURE — 2500000004 HC RX 250 GENERAL PHARMACY W/ HCPCS (ALT 636 FOR OP/ED)

## 2025-02-04 PROCEDURE — 82435 ASSAY OF BLOOD CHLORIDE: CPT

## 2025-02-04 PROCEDURE — 83605 ASSAY OF LACTIC ACID: CPT | Performed by: INTERNAL MEDICINE

## 2025-02-04 PROCEDURE — BT1F1ZZ FLUOROSCOPY OF LEFT KIDNEY, URETER AND BLADDER USING LOW OSMOLAR CONTRAST: ICD-10-PCS | Performed by: STUDENT IN AN ORGANIZED HEALTH CARE EDUCATION/TRAINING PROGRAM

## 2025-02-04 PROCEDURE — 7100000002 HC RECOVERY ROOM TIME - EACH INCREMENTAL 1 MINUTE: Performed by: STUDENT IN AN ORGANIZED HEALTH CARE EDUCATION/TRAINING PROGRAM

## 2025-02-04 PROCEDURE — 0T778DZ DILATION OF LEFT URETER WITH INTRALUMINAL DEVICE, VIA NATURAL OR ARTIFICIAL OPENING ENDOSCOPIC: ICD-10-PCS | Performed by: STUDENT IN AN ORGANIZED HEALTH CARE EDUCATION/TRAINING PROGRAM

## 2025-02-04 PROCEDURE — C1769 GUIDE WIRE: HCPCS | Performed by: STUDENT IN AN ORGANIZED HEALTH CARE EDUCATION/TRAINING PROGRAM

## 2025-02-04 PROCEDURE — 96361 HYDRATE IV INFUSION ADD-ON: CPT

## 2025-02-04 PROCEDURE — 96376 TX/PRO/DX INJ SAME DRUG ADON: CPT

## 2025-02-04 PROCEDURE — 99222 1ST HOSP IP/OBS MODERATE 55: CPT | Performed by: NURSE PRACTITIONER

## 2025-02-04 PROCEDURE — 2500000004 HC RX 250 GENERAL PHARMACY W/ HCPCS (ALT 636 FOR OP/ED): Performed by: NURSE ANESTHETIST, CERTIFIED REGISTERED

## 2025-02-04 PROCEDURE — A52352 PR CYSTO/URETERO/PYELOSCOPY, CALCULUS TX: Performed by: NURSE ANESTHETIST, CERTIFIED REGISTERED

## 2025-02-04 PROCEDURE — 74420 UROGRAPHY RTRGR +-KUB: CPT | Performed by: STUDENT IN AN ORGANIZED HEALTH CARE EDUCATION/TRAINING PROGRAM

## 2025-02-04 PROCEDURE — 2500000001 HC RX 250 WO HCPCS SELF ADMINISTERED DRUGS (ALT 637 FOR MEDICARE OP): Performed by: INTERNAL MEDICINE

## 2025-02-04 PROCEDURE — 2500000004 HC RX 250 GENERAL PHARMACY W/ HCPCS (ALT 636 FOR OP/ED): Performed by: NURSE PRACTITIONER

## 2025-02-04 PROCEDURE — 2500000004 HC RX 250 GENERAL PHARMACY W/ HCPCS (ALT 636 FOR OP/ED): Performed by: INTERNAL MEDICINE

## 2025-02-04 PROCEDURE — 7100000001 HC RECOVERY ROOM TIME - INITIAL BASE CHARGE: Performed by: STUDENT IN AN ORGANIZED HEALTH CARE EDUCATION/TRAINING PROGRAM

## 2025-02-04 PROCEDURE — 86900 BLOOD TYPING SEROLOGIC ABO: CPT | Performed by: INTERNAL MEDICINE

## 2025-02-04 PROCEDURE — 2500000005 HC RX 250 GENERAL PHARMACY W/O HCPCS: Performed by: EMERGENCY MEDICINE

## 2025-02-04 PROCEDURE — 74176 CT ABD & PELVIS W/O CONTRAST: CPT | Performed by: RADIOLOGY

## 2025-02-04 PROCEDURE — 2550000001 HC RX 255 CONTRASTS: Performed by: STUDENT IN AN ORGANIZED HEALTH CARE EDUCATION/TRAINING PROGRAM

## 2025-02-04 PROCEDURE — 2720000007 HC OR 272 NO HCPCS: Performed by: STUDENT IN AN ORGANIZED HEALTH CARE EDUCATION/TRAINING PROGRAM

## 2025-02-04 PROCEDURE — 83735 ASSAY OF MAGNESIUM: CPT

## 2025-02-04 PROCEDURE — 52352 CYSTOURETERO W/STONE REMOVE: CPT | Performed by: STUDENT IN AN ORGANIZED HEALTH CARE EDUCATION/TRAINING PROGRAM

## 2025-02-04 PROCEDURE — 85027 COMPLETE CBC AUTOMATED: CPT

## 2025-02-04 PROCEDURE — 36415 COLL VENOUS BLD VENIPUNCTURE: CPT

## 2025-02-04 PROCEDURE — A52352 PR CYSTO/URETERO/PYELOSCOPY, CALCULUS TX: Performed by: ANESTHESIOLOGY

## 2025-02-04 PROCEDURE — 74420 UROGRAPHY RTRGR +-KUB: CPT

## 2025-02-04 PROCEDURE — 85610 PROTHROMBIN TIME: CPT | Performed by: INTERNAL MEDICINE

## 2025-02-04 DEVICE — STENT KIT, IMAJIN HYDRO, 6FR X 24CM, POSITIONER, NO GUIDEWIRE: Type: IMPLANTABLE DEVICE | Site: URETER | Status: FUNCTIONAL

## 2025-02-04 RX ORDER — ONDANSETRON HYDROCHLORIDE 2 MG/ML
4 INJECTION, SOLUTION INTRAVENOUS ONCE AS NEEDED
Status: CANCELLED | OUTPATIENT
Start: 2025-02-04

## 2025-02-04 RX ORDER — DIPHENHYDRAMINE HYDROCHLORIDE 50 MG/ML
12.5 INJECTION INTRAMUSCULAR; INTRAVENOUS ONCE AS NEEDED
Status: CANCELLED | OUTPATIENT
Start: 2025-02-04

## 2025-02-04 RX ORDER — DOCUSATE SODIUM 100 MG/1
100 CAPSULE, LIQUID FILLED ORAL 2 TIMES DAILY
Status: DISCONTINUED | OUTPATIENT
Start: 2025-02-04 | End: 2025-02-05 | Stop reason: HOSPADM

## 2025-02-04 RX ORDER — POLYETHYLENE GLYCOL 3350 17 G/17G
17 POWDER, FOR SOLUTION ORAL DAILY
Status: DISCONTINUED | OUTPATIENT
Start: 2025-02-04 | End: 2025-02-05 | Stop reason: HOSPADM

## 2025-02-04 RX ORDER — LIDOCAINE HYDROCHLORIDE 20 MG/ML
INJECTION, SOLUTION INFILTRATION; PERINEURAL AS NEEDED
Status: DISCONTINUED | OUTPATIENT
Start: 2025-02-04 | End: 2025-02-04

## 2025-02-04 RX ORDER — SODIUM CHLORIDE, SODIUM LACTATE, POTASSIUM CHLORIDE, CALCIUM CHLORIDE 600; 310; 30; 20 MG/100ML; MG/100ML; MG/100ML; MG/100ML
100 INJECTION, SOLUTION INTRAVENOUS CONTINUOUS
Status: DISCONTINUED | OUTPATIENT
Start: 2025-02-04 | End: 2025-02-05

## 2025-02-04 RX ORDER — CIPROFLOXACIN 2 MG/ML
400 INJECTION, SOLUTION INTRAVENOUS EVERY 12 HOURS
Status: DISCONTINUED | OUTPATIENT
Start: 2025-02-04 | End: 2025-02-04

## 2025-02-04 RX ORDER — CEFAZOLIN 1 G/1
INJECTION, POWDER, FOR SOLUTION INTRAVENOUS AS NEEDED
Status: DISCONTINUED | OUTPATIENT
Start: 2025-02-04 | End: 2025-02-04

## 2025-02-04 RX ORDER — FENTANYL CITRATE 50 UG/ML
INJECTION, SOLUTION INTRAMUSCULAR; INTRAVENOUS AS NEEDED
Status: DISCONTINUED | OUTPATIENT
Start: 2025-02-04 | End: 2025-02-04

## 2025-02-04 RX ORDER — MEPERIDINE HYDROCHLORIDE 25 MG/ML
12.5 INJECTION INTRAMUSCULAR; INTRAVENOUS; SUBCUTANEOUS EVERY 10 MIN PRN
Status: CANCELLED | OUTPATIENT
Start: 2025-02-04

## 2025-02-04 RX ORDER — ONDANSETRON HYDROCHLORIDE 2 MG/ML
INJECTION, SOLUTION INTRAVENOUS AS NEEDED
Status: DISCONTINUED | OUTPATIENT
Start: 2025-02-04 | End: 2025-02-04

## 2025-02-04 RX ORDER — HEPARIN SODIUM 5000 [USP'U]/ML
5000 INJECTION, SOLUTION INTRAVENOUS; SUBCUTANEOUS EVERY 8 HOURS SCHEDULED
Status: DISCONTINUED | OUTPATIENT
Start: 2025-02-04 | End: 2025-02-05 | Stop reason: HOSPADM

## 2025-02-04 RX ORDER — ONDANSETRON 4 MG/1
4 TABLET, FILM COATED ORAL EVERY 8 HOURS PRN
Status: DISCONTINUED | OUTPATIENT
Start: 2025-02-04 | End: 2025-02-05 | Stop reason: HOSPADM

## 2025-02-04 RX ORDER — SODIUM CHLORIDE, SODIUM LACTATE, POTASSIUM CHLORIDE, CALCIUM CHLORIDE 600; 310; 30; 20 MG/100ML; MG/100ML; MG/100ML; MG/100ML
100 INJECTION, SOLUTION INTRAVENOUS CONTINUOUS
Status: CANCELLED | OUTPATIENT
Start: 2025-02-04 | End: 2025-02-05

## 2025-02-04 RX ORDER — ALBUTEROL SULFATE 0.83 MG/ML
2.5 SOLUTION RESPIRATORY (INHALATION) ONCE AS NEEDED
Status: CANCELLED | OUTPATIENT
Start: 2025-02-04

## 2025-02-04 RX ORDER — DROPERIDOL 2.5 MG/ML
0.62 INJECTION, SOLUTION INTRAMUSCULAR; INTRAVENOUS ONCE AS NEEDED
Status: CANCELLED | OUTPATIENT
Start: 2025-02-04

## 2025-02-04 RX ORDER — OXYCODONE HYDROCHLORIDE 5 MG/1
5 TABLET ORAL EVERY 4 HOURS PRN
Status: CANCELLED | OUTPATIENT
Start: 2025-02-04

## 2025-02-04 RX ORDER — MIDAZOLAM HYDROCHLORIDE 1 MG/ML
INJECTION INTRAMUSCULAR; INTRAVENOUS AS NEEDED
Status: DISCONTINUED | OUTPATIENT
Start: 2025-02-04 | End: 2025-02-04

## 2025-02-04 RX ORDER — OXYCODONE HYDROCHLORIDE 5 MG/1
5 TABLET ORAL EVERY 6 HOURS PRN
Status: DISCONTINUED | OUTPATIENT
Start: 2025-02-04 | End: 2025-02-05 | Stop reason: HOSPADM

## 2025-02-04 RX ORDER — PROPOFOL 10 MG/ML
INJECTION, EMULSION INTRAVENOUS AS NEEDED
Status: DISCONTINUED | OUTPATIENT
Start: 2025-02-04 | End: 2025-02-04

## 2025-02-04 RX ORDER — OXYCODONE HYDROCHLORIDE 10 MG/1
10 TABLET ORAL EVERY 6 HOURS PRN
Status: DISCONTINUED | OUTPATIENT
Start: 2025-02-04 | End: 2025-02-05 | Stop reason: HOSPADM

## 2025-02-04 RX ORDER — PHENYLEPHRINE HCL IN 0.9% NACL 0.4MG/10ML
SYRINGE (ML) INTRAVENOUS AS NEEDED
Status: DISCONTINUED | OUTPATIENT
Start: 2025-02-04 | End: 2025-02-04

## 2025-02-04 RX ORDER — TAMSULOSIN HYDROCHLORIDE 0.4 MG/1
0.4 CAPSULE ORAL DAILY
Status: DISCONTINUED | OUTPATIENT
Start: 2025-02-04 | End: 2025-02-05 | Stop reason: HOSPADM

## 2025-02-04 RX ADMIN — Medication 1 L/MIN: at 21:11

## 2025-02-04 RX ADMIN — HYDROMORPHONE HYDROCHLORIDE 0.5 MG: 1 INJECTION, SOLUTION INTRAMUSCULAR; INTRAVENOUS; SUBCUTANEOUS at 11:16

## 2025-02-04 RX ADMIN — Medication 200 MCG: at 16:18

## 2025-02-04 RX ADMIN — OXYCODONE HYDROCHLORIDE 10 MG: 10 TABLET ORAL at 04:25

## 2025-02-04 RX ADMIN — SODIUM CHLORIDE, POTASSIUM CHLORIDE, SODIUM LACTATE AND CALCIUM CHLORIDE 100 ML/HR: 600; 310; 30; 20 INJECTION, SOLUTION INTRAVENOUS at 11:28

## 2025-02-04 RX ADMIN — HEPARIN SODIUM 5000 UNITS: 5000 INJECTION INTRAVENOUS; SUBCUTANEOUS at 21:06

## 2025-02-04 RX ADMIN — MIDAZOLAM HYDROCHLORIDE 2 MG: 1 INJECTION, SOLUTION INTRAMUSCULAR; INTRAVENOUS at 16:00

## 2025-02-04 RX ADMIN — TAMSULOSIN HYDROCHLORIDE 0.4 MG: 0.4 CAPSULE ORAL at 08:51

## 2025-02-04 RX ADMIN — SODIUM CHLORIDE, POTASSIUM CHLORIDE, SODIUM LACTATE AND CALCIUM CHLORIDE 1000 ML: 600; 310; 30; 20 INJECTION, SOLUTION INTRAVENOUS at 03:25

## 2025-02-04 RX ADMIN — HEPARIN SODIUM 5000 UNITS: 5000 INJECTION INTRAVENOUS; SUBCUTANEOUS at 05:32

## 2025-02-04 RX ADMIN — SODIUM CHLORIDE, POTASSIUM CHLORIDE, SODIUM LACTATE AND CALCIUM CHLORIDE 100 ML/HR: 600; 310; 30; 20 INJECTION, SOLUTION INTRAVENOUS at 18:00

## 2025-02-04 RX ADMIN — OXYCODONE HYDROCHLORIDE 5 MG: 5 TABLET ORAL at 08:51

## 2025-02-04 RX ADMIN — CEFAZOLIN 2 G: 1 INJECTION, POWDER, FOR SOLUTION INTRAMUSCULAR; INTRAVENOUS at 16:00

## 2025-02-04 RX ADMIN — DEXAMETHASONE SODIUM PHOSPHATE 8 MG: 4 INJECTION INTRA-ARTICULAR; INTRALESIONAL; INTRAMUSCULAR; INTRAVENOUS; SOFT TISSUE at 16:19

## 2025-02-04 RX ADMIN — DOCUSATE SODIUM 100 MG: 100 CAPSULE, LIQUID FILLED ORAL at 17:57

## 2025-02-04 RX ADMIN — DOCUSATE SODIUM 100 MG: 100 CAPSULE, LIQUID FILLED ORAL at 21:06

## 2025-02-04 RX ADMIN — Medication 2 L/MIN: at 01:25

## 2025-02-04 RX ADMIN — ONDANSETRON 4 MG: 2 INJECTION, SOLUTION INTRAMUSCULAR; INTRAVENOUS at 16:18

## 2025-02-04 RX ADMIN — SODIUM CHLORIDE 1000 ML: 9 INJECTION, SOLUTION INTRAVENOUS at 00:32

## 2025-02-04 RX ADMIN — HYDROMORPHONE HYDROCHLORIDE 0.5 MG: 1 INJECTION, SOLUTION INTRAMUSCULAR; INTRAVENOUS; SUBCUTANEOUS at 00:29

## 2025-02-04 RX ADMIN — FENTANYL CITRATE 100 MCG: 50 INJECTION, SOLUTION INTRAMUSCULAR; INTRAVENOUS at 15:59

## 2025-02-04 RX ADMIN — PROPOFOL 200 MG: 10 INJECTION, EMULSION INTRAVENOUS at 16:02

## 2025-02-04 RX ADMIN — POLYETHYLENE GLYCOL 3350 17 G: 17 POWDER, FOR SOLUTION ORAL at 17:57

## 2025-02-04 RX ADMIN — LIDOCAINE HYDROCHLORIDE 100 MG: 20 INJECTION, SOLUTION INFILTRATION; PERINEURAL at 15:58

## 2025-02-04 RX ADMIN — Medication 1 L/MIN: at 08:48

## 2025-02-04 RX ADMIN — Medication 1 L/MIN: at 10:21

## 2025-02-04 SDOH — ECONOMIC STABILITY: INCOME INSECURITY: IN THE PAST 12 MONTHS HAS THE ELECTRIC, GAS, OIL, OR WATER COMPANY THREATENED TO SHUT OFF SERVICES IN YOUR HOME?: NO

## 2025-02-04 SDOH — ECONOMIC STABILITY: FOOD INSECURITY: WITHIN THE PAST 12 MONTHS, THE FOOD YOU BOUGHT JUST DIDN'T LAST AND YOU DIDN'T HAVE MONEY TO GET MORE.: NEVER TRUE

## 2025-02-04 SDOH — HEALTH STABILITY: MENTAL HEALTH: CURRENT SMOKER: 0

## 2025-02-04 SDOH — SOCIAL STABILITY: SOCIAL INSECURITY: WITHIN THE LAST YEAR, HAVE YOU BEEN HUMILIATED OR EMOTIONALLY ABUSED IN OTHER WAYS BY YOUR PARTNER OR EX-PARTNER?: NO

## 2025-02-04 SDOH — SOCIAL STABILITY: SOCIAL INSECURITY
WITHIN THE LAST YEAR, HAVE YOU BEEN KICKED, HIT, SLAPPED, OR OTHERWISE PHYSICALLY HURT BY YOUR PARTNER OR EX-PARTNER?: NO

## 2025-02-04 SDOH — SOCIAL STABILITY: SOCIAL INSECURITY: DOES ANYONE TRY TO KEEP YOU FROM HAVING/CONTACTING OTHER FRIENDS OR DOING THINGS OUTSIDE YOUR HOME?: NO

## 2025-02-04 SDOH — SOCIAL STABILITY: SOCIAL INSECURITY: WITHIN THE LAST YEAR, HAVE YOU BEEN AFRAID OF YOUR PARTNER OR EX-PARTNER?: NO

## 2025-02-04 SDOH — SOCIAL STABILITY: SOCIAL INSECURITY: ARE YOU OR HAVE YOU BEEN THREATENED OR ABUSED PHYSICALLY, EMOTIONALLY, OR SEXUALLY BY ANYONE?: NO

## 2025-02-04 SDOH — ECONOMIC STABILITY: FOOD INSECURITY: WITHIN THE PAST 12 MONTHS, YOU WORRIED THAT YOUR FOOD WOULD RUN OUT BEFORE YOU GOT THE MONEY TO BUY MORE.: NEVER TRUE

## 2025-02-04 SDOH — SOCIAL STABILITY: SOCIAL INSECURITY: DO YOU FEEL UNSAFE GOING BACK TO THE PLACE WHERE YOU ARE LIVING?: NO

## 2025-02-04 SDOH — SOCIAL STABILITY: SOCIAL INSECURITY: HAVE YOU HAD ANY THOUGHTS OF HARMING ANYONE ELSE?: NO

## 2025-02-04 SDOH — SOCIAL STABILITY: SOCIAL INSECURITY
WITHIN THE LAST YEAR, HAVE YOU BEEN RAPED OR FORCED TO HAVE ANY KIND OF SEXUAL ACTIVITY BY YOUR PARTNER OR EX-PARTNER?: NO

## 2025-02-04 SDOH — SOCIAL STABILITY: SOCIAL INSECURITY: ARE THERE ANY APPARENT SIGNS OF INJURIES/BEHAVIORS THAT COULD BE RELATED TO ABUSE/NEGLECT?: NO

## 2025-02-04 SDOH — SOCIAL STABILITY: SOCIAL INSECURITY: DO YOU FEEL ANYONE HAS EXPLOITED OR TAKEN ADVANTAGE OF YOU FINANCIALLY OR OF YOUR PERSONAL PROPERTY?: NO

## 2025-02-04 SDOH — SOCIAL STABILITY: SOCIAL INSECURITY: HAS ANYONE EVER THREATENED TO HURT YOUR FAMILY OR YOUR PETS?: NO

## 2025-02-04 SDOH — SOCIAL STABILITY: SOCIAL INSECURITY: HAVE YOU HAD THOUGHTS OF HARMING ANYONE ELSE?: NO

## 2025-02-04 SDOH — SOCIAL STABILITY: SOCIAL INSECURITY: ABUSE: ADULT

## 2025-02-04 SDOH — SOCIAL STABILITY: SOCIAL INSECURITY: WERE YOU ABLE TO COMPLETE ALL THE BEHAVIORAL HEALTH SCREENINGS?: YES

## 2025-02-04 ASSESSMENT — ACTIVITIES OF DAILY LIVING (ADL)
GROOMING: INDEPENDENT
DRESSING YOURSELF: INDEPENDENT
FEEDING YOURSELF: INDEPENDENT
HEARING - RIGHT EAR: FUNCTIONAL
BATHING: INDEPENDENT
JUDGMENT_ADEQUATE_SAFELY_COMPLETE_DAILY_ACTIVITIES: YES
TOILETING: INDEPENDENT
LACK_OF_TRANSPORTATION: NO
WALKS IN HOME: INDEPENDENT
ASSISTIVE_DEVICE: EYEGLASSES
HEARING - LEFT EAR: FUNCTIONAL
LACK_OF_TRANSPORTATION: NO
PATIENT'S MEMORY ADEQUATE TO SAFELY COMPLETE DAILY ACTIVITIES?: YES
ADEQUATE_TO_COMPLETE_ADL: YES

## 2025-02-04 ASSESSMENT — COGNITIVE AND FUNCTIONAL STATUS - GENERAL
DAILY ACTIVITIY SCORE: 23
MOBILITY SCORE: 20
STANDING UP FROM CHAIR USING ARMS: A LITTLE
DAILY ACTIVITIY SCORE: 24
CLIMB 3 TO 5 STEPS WITH RAILING: A LITTLE
WALKING IN HOSPITAL ROOM: A LITTLE
MOVING TO AND FROM BED TO CHAIR: A LITTLE
TOILETING: A LITTLE
MOBILITY SCORE: 24
PATIENT BASELINE BEDBOUND: NO

## 2025-02-04 ASSESSMENT — PAIN SCALES - GENERAL
PAINLEVEL_OUTOF10: 4
PAINLEVEL_OUTOF10: 0 - NO PAIN
PAINLEVEL_OUTOF10: 4
PAINLEVEL_OUTOF10: 0 - NO PAIN
PAIN_LEVEL: 2
PAINLEVEL_OUTOF10: 0 - NO PAIN
PAINLEVEL_OUTOF10: 7
PAINLEVEL_OUTOF10: 5 - MODERATE PAIN
PAINLEVEL_OUTOF10: 2
PAINLEVEL_OUTOF10: 2
PAINLEVEL_OUTOF10: 7
PAINLEVEL_OUTOF10: 6

## 2025-02-04 ASSESSMENT — LIFESTYLE VARIABLES
AUDIT-C TOTAL SCORE: 0
HOW MANY STANDARD DRINKS CONTAINING ALCOHOL DO YOU HAVE ON A TYPICAL DAY: PATIENT DOES NOT DRINK
SKIP TO QUESTIONS 9-10: 1
HOW OFTEN DO YOU HAVE 6 OR MORE DRINKS ON ONE OCCASION: NEVER
HOW OFTEN DO YOU HAVE A DRINK CONTAINING ALCOHOL: NEVER
AUDIT-C TOTAL SCORE: 0

## 2025-02-04 ASSESSMENT — PAIN - FUNCTIONAL ASSESSMENT
PAIN_FUNCTIONAL_ASSESSMENT: 0-10

## 2025-02-04 ASSESSMENT — PAIN DESCRIPTION - LOCATION
LOCATION: ABDOMEN

## 2025-02-04 ASSESSMENT — PATIENT HEALTH QUESTIONNAIRE - PHQ9
2. FEELING DOWN, DEPRESSED OR HOPELESS: NOT AT ALL
SUM OF ALL RESPONSES TO PHQ9 QUESTIONS 1 & 2: 0
1. LITTLE INTEREST OR PLEASURE IN DOING THINGS: NOT AT ALL

## 2025-02-04 ASSESSMENT — PAIN DESCRIPTION - ORIENTATION
ORIENTATION: LEFT;LOWER
ORIENTATION: LEFT;LOWER

## 2025-02-04 NOTE — H&P (VIEW-ONLY)
Reason For Consult  4 mm left kidney stone with hydronephrosis, LYNDA    History Of Present Illness  Stacey Davis is a 69 y.o. female with past medical history significant for HTN and HLD who presented to [facility] 2/3 for left flank pain. Patient reports pain started yesterday morning and progressively worsened throughout the day. She reports associated nausea, vomiting, and chills. Denies previous history of kidney stones.     In the ED, patient hemodynamically stable, afebrile. Labs remarkable for creat 1.77, lactate 2.2, WBC 16.6. Urine negative for infection. CT a/p signficant for left hydronephrosis and hydroureter with a 4 mm calculus in the distal left ureter at the UVJ. Patient admitted to medicine with urology consult for further management.      Past Medical History  She has a past medical history of Age-related osteoporosis without current pathological fracture (01/04/2023), BRCA1 negative, Nasal congestion (06/01/2021), and Unspecified symptoms and signs involving the genitourinary system (11/27/2020).    Surgical History  She has a past surgical history that includes Total abdominal hysterectomy w/ bilateral salpingoophorectomy (10/15/2014); Hysterectomy (10/15/2014); Tonsillectomy (10/15/2014); Mouth surgery (10/15/2014); and Belt abdominoplasty (10/15/2014).     Social History  She reports that she has never smoked. She has never used smokeless tobacco. She reports that she does not drink alcohol and does not use drugs.    Family History  Family History   Problem Relation Name Age of Onset    Breast cancer Mother      Arthritis Father      Heart attack Father  46    Breast cancer Sister  45    Breast cancer Sister  45    No Known Problems Sister      Breast cancer Maternal Grandmother  40    Glaucoma Maternal Grandfather          Allergies  Alendronate and Statins-hmg-coa reductase inhibitors    Physical Exam  Physical Exam  Constitutional:       General: She is not in acute  "distress.  Cardiovascular:      Rate and Rhythm: Normal rate.   Abdominal:      General: There is no distension.      Palpations: Abdomen is soft.      Comments: Mild tenderness on palpation over left abd and left lower back.    Skin:     General: Skin is warm and dry.   Neurological:      Mental Status: She is alert and oriented to person, place, and time.            Last Recorded Vitals  Blood pressure 139/83, pulse 86, temperature 36.8 °C (98.2 °F), temperature source Temporal, resp. rate 16, height 1.575 m (5' 2\"), weight 58.2 kg (128 lb 3.2 oz), SpO2 95%.    Relevant Results      Results for orders placed or performed during the hospital encounter of 02/03/25 (from the past 24 hours)   CBC with Differential   Result Value Ref Range    WBC 16.6 (H) 4.4 - 11.3 x10*3/uL    nRBC 0.0 0.0 - 0.0 /100 WBCs    RBC 4.08 4.00 - 5.20 x10*6/uL    Hemoglobin 11.9 (L) 12.0 - 16.0 g/dL    Hematocrit 35.2 (L) 36.0 - 46.0 %    MCV 86 80 - 100 fL    MCH 29.2 26.0 - 34.0 pg    MCHC 33.8 32.0 - 36.0 g/dL    RDW 13.2 11.5 - 14.5 %    Platelets 295 150 - 450 x10*3/uL    Neutrophils % 85.0 40.0 - 80.0 %    Immature Granulocytes %, Automated 0.4 0.0 - 0.9 %    Lymphocytes % 8.4 13.0 - 44.0 %    Monocytes % 5.6 2.0 - 10.0 %    Eosinophils % 0.2 0.0 - 6.0 %    Basophils % 0.4 0.0 - 2.0 %    Neutrophils Absolute 14.14 (H) 1.20 - 7.70 x10*3/uL    Immature Granulocytes Absolute, Automated 0.07 0.00 - 0.70 x10*3/uL    Lymphocytes Absolute 1.40 1.20 - 4.80 x10*3/uL    Monocytes Absolute 0.93 0.10 - 1.00 x10*3/uL    Eosinophils Absolute 0.03 0.00 - 0.70 x10*3/uL    Basophils Absolute 0.06 0.00 - 0.10 x10*3/uL   Comprehensive Metabolic Panel   Result Value Ref Range    Glucose 150 (H) 74 - 99 mg/dL    Sodium 136 136 - 145 mmol/L    Potassium 3.8 3.5 - 5.3 mmol/L    Chloride 100 98 - 107 mmol/L    Bicarbonate 20 (L) 21 - 32 mmol/L    Anion Gap 20 10 - 20 mmol/L    Urea Nitrogen 35 (H) 6 - 23 mg/dL    Creatinine 1.77 (H) 0.50 - 1.05 mg/dL    eGFR " 31 (L) >60 mL/min/1.73m*2    Calcium 10.0 8.6 - 10.3 mg/dL    Albumin 4.3 3.4 - 5.0 g/dL    Alkaline Phosphatase 83 33 - 136 U/L    Total Protein 7.9 6.4 - 8.2 g/dL    AST 20 9 - 39 U/L    Bilirubin, Total 0.5 0.0 - 1.2 mg/dL    ALT 14 7 - 45 U/L   Lipase   Result Value Ref Range    Lipase 29 9 - 82 U/L   Lactate   Result Value Ref Range    Lactate 2.2 (H) 0.4 - 2.0 mmol/L   ECG 12 lead   Result Value Ref Range    Ventricular Rate 89 BPM    Atrial Rate 89 BPM    HI Interval 172 ms    QRS Duration 66 ms    QT Interval 372 ms    QTC Calculation(Bazett) 452 ms    P Axis 72 degrees    R Axis 30 degrees    T Axis 7 degrees    QRS Count 15 beats    Q Onset 228 ms    P Onset 142 ms    P Offset 191 ms    T Offset 414 ms    QTC Fredericia 424 ms   Urinalysis with Reflex Culture and Microscopic   Result Value Ref Range    Color, Urine Colorless (N) Light-Yellow, Yellow, Dark-Yellow    Appearance, Urine Clear Clear    Specific Gravity, Urine 1.010 1.005 - 1.035    pH, Urine 7.0 5.0, 5.5, 6.0, 6.5, 7.0, 7.5, 8.0    Protein, Urine NEGATIVE NEGATIVE, 10 (TRACE), 20 (TRACE) mg/dL    Glucose, Urine 50 (TRACE) (A) Normal mg/dL    Blood, Urine 0.1 (1+) (A) NEGATIVE    Ketones, Urine 10 (1+) (A) NEGATIVE mg/dL    Bilirubin, Urine NEGATIVE NEGATIVE    Urobilinogen, Urine Normal Normal mg/dL    Nitrite, Urine NEGATIVE NEGATIVE    Leukocyte Esterase, Urine NEGATIVE NEGATIVE   Urinalysis Microscopic   Result Value Ref Range    WBC, Urine NONE 1-5, NONE /HPF    RBC, Urine >20 (A) NONE, 1-2, 3-5 /HPF   Blood Gas Venous Full Panel   Result Value Ref Range    POCT pH, Venous 7.33 7.33 - 7.43 pH    POCT pCO2, Venous 44 41 - 51 mm Hg    POCT pO2, Venous 43 35 - 45 mm Hg    POCT SO2, Venous 74 45 - 75 %    POCT Oxy Hemoglobin, Venous 72.2 45.0 - 75.0 %    POCT Hematocrit Calculated, Venous 34.0 (L) 36.0 - 46.0 %    POCT Sodium, Venous 136 136 - 145 mmol/L    POCT Potassium, Venous 4.6 3.5 - 5.3 mmol/L    POCT Chloride, Venous 107 98 - 107 mmol/L     POCT Ionized Calicum, Venous 1.17 1.10 - 1.33 mmol/L    POCT Glucose, Venous 132 (H) 74 - 99 mg/dL    POCT Lactate, Venous 1.9 0.4 - 2.0 mmol/L    POCT Base Excess, Venous -2.8 (L) -2.0 - 3.0 mmol/L    POCT HCO3 Calculated, Venous 23.2 22.0 - 26.0 mmol/L    POCT Hemoglobin, Venous 11.3 (L) 12.0 - 16.0 g/dL    POCT Anion Gap, Venous 10.0 10.0 - 25.0 mmol/L    Patient Temperature      FiO2 24 %   Magnesium   Result Value Ref Range    Magnesium 1.89 1.60 - 2.40 mg/dL   CBC   Result Value Ref Range    WBC 11.2 4.4 - 11.3 x10*3/uL    nRBC 0.0 0.0 - 0.0 /100 WBCs    RBC 3.78 (L) 4.00 - 5.20 x10*6/uL    Hemoglobin 10.9 (L) 12.0 - 16.0 g/dL    Hematocrit 33.0 (L) 36.0 - 46.0 %    MCV 87 80 - 100 fL    MCH 28.8 26.0 - 34.0 pg    MCHC 33.0 32.0 - 36.0 g/dL    RDW 13.1 11.5 - 14.5 %    Platelets 204 150 - 450 x10*3/uL   Renal Function Panel   Result Value Ref Range    Glucose 127 (H) 74 - 99 mg/dL    Sodium 136 136 - 145 mmol/L    Potassium 4.4 3.5 - 5.3 mmol/L    Chloride 105 98 - 107 mmol/L    Bicarbonate 22 21 - 32 mmol/L    Anion Gap 13 10 - 20 mmol/L    Urea Nitrogen 28 (H) 6 - 23 mg/dL    Creatinine 1.59 (H) 0.50 - 1.05 mg/dL    eGFR 35 (L) >60 mL/min/1.73m*2    Calcium 8.5 (L) 8.6 - 10.3 mg/dL    Phosphorus 3.3 2.5 - 4.9 mg/dL    Albumin 3.8 3.4 - 5.0 g/dL   Lactate   Result Value Ref Range    Lactate 1.8 0.4 - 2.0 mmol/L   Type and screen   Result Value Ref Range    ABO TYPE O     Rh TYPE NEG     ANTIBODY SCREEN NEG    Protime-INR   Result Value Ref Range    Protime 11.0 9.8 - 12.8 seconds    INR 1.0 0.9 - 1.1   APTT   Result Value Ref Range    aPTT 29 27 - 38 seconds     ECG 12 lead    Result Date: 2/4/2025  Normal sinus rhythm Possible Left atrial enlargement Nonspecific ST and T wave abnormality Abnormal ECG When compared with ECG of 28-OCT-2022 09:38, Nonspecific T wave abnormality now evident in Anterior leads    CT abdomen pelvis wo IV contrast    Result Date: 2/4/2025  Interpreted By:  Pavan Christine, STUDY:  CT ABDOMEN PELVIS WO IV CONTRAST;  2/4/2025 12:41 am   INDICATION: Signs/Symptoms:LLQ abd pain.   COMPARISON: None.   ACCESSION NUMBER(S): [id number]   ORDERING CLINICIAN: JESUS KENNEDY   TECHNIQUE: Contiguous axial images of the abdomen and pelvis were obtained without intravenous contrast. Coronal and sagittal reformatted images were obtained from the axial images.   FINDINGS: There is limited evaluation of the lung bases. Bibasilar subsegmental atelectasis. Small hiatal hernia.   Evaluation of the abdominal viscera is limited secondary to lack of intravenous contrast. Limited evaluation for liver mass on noncontrast examination. The gallbladder is present. No dilatation common bile duct.   The pancreas, spleen, and adrenal glands appear unremarkable.   3.1 cm cyst in the right kidney. There is left hydronephrosis and hydroureter with a 4 mm calculus in the distal left ureter at the ureteral vesicular junction.   No evidence of bowel obstruction or acute appendicitis.   Urinary bladder appears unremarkable.   There is scoliotic curvature and degenerative change of the lumbar spine.       Left hydronephrosis and hydroureter with a 4 mm calculus in the distal left ureter at the ureteral vesicular junction.   MACRO: None   Signed by: Pavan Christine 2/4/2025 1:58 AM Dictation workstation:   [id number]         Scheduled medications  heparin (porcine), 5,000 Units, subcutaneous, q8h SULEMAN  oxygen, , inhalation, Continuous - Inhalation  tamsulosin, 0.4 mg, oral, Daily      Continuous medications     PRN medications  PRN medications: ondansetron, oxyCODONE, oxyCODONE  Results for orders placed or performed during the hospital encounter of 02/03/25 (from the past 24 hours)   CBC with Differential   Result Value Ref Range    WBC 16.6 (H) 4.4 - 11.3 x10*3/uL    nRBC 0.0 0.0 - 0.0 /100 WBCs    RBC 4.08 4.00 - 5.20 x10*6/uL    Hemoglobin 11.9 (L) 12.0 - 16.0 g/dL    Hematocrit 35.2 (L) 36.0 - 46.0 %    MCV 86 80 - 100 fL     MCH 29.2 26.0 - 34.0 pg    MCHC 33.8 32.0 - 36.0 g/dL    RDW 13.2 11.5 - 14.5 %    Platelets 295 150 - 450 x10*3/uL    Neutrophils % 85.0 40.0 - 80.0 %    Immature Granulocytes %, Automated 0.4 0.0 - 0.9 %    Lymphocytes % 8.4 13.0 - 44.0 %    Monocytes % 5.6 2.0 - 10.0 %    Eosinophils % 0.2 0.0 - 6.0 %    Basophils % 0.4 0.0 - 2.0 %    Neutrophils Absolute 14.14 (H) 1.20 - 7.70 x10*3/uL    Immature Granulocytes Absolute, Automated 0.07 0.00 - 0.70 x10*3/uL    Lymphocytes Absolute 1.40 1.20 - 4.80 x10*3/uL    Monocytes Absolute 0.93 0.10 - 1.00 x10*3/uL    Eosinophils Absolute 0.03 0.00 - 0.70 x10*3/uL    Basophils Absolute 0.06 0.00 - 0.10 x10*3/uL   Comprehensive Metabolic Panel   Result Value Ref Range    Glucose 150 (H) 74 - 99 mg/dL    Sodium 136 136 - 145 mmol/L    Potassium 3.8 3.5 - 5.3 mmol/L    Chloride 100 98 - 107 mmol/L    Bicarbonate 20 (L) 21 - 32 mmol/L    Anion Gap 20 10 - 20 mmol/L    Urea Nitrogen 35 (H) 6 - 23 mg/dL    Creatinine 1.77 (H) 0.50 - 1.05 mg/dL    eGFR 31 (L) >60 mL/min/1.73m*2    Calcium 10.0 8.6 - 10.3 mg/dL    Albumin 4.3 3.4 - 5.0 g/dL    Alkaline Phosphatase 83 33 - 136 U/L    Total Protein 7.9 6.4 - 8.2 g/dL    AST 20 9 - 39 U/L    Bilirubin, Total 0.5 0.0 - 1.2 mg/dL    ALT 14 7 - 45 U/L   Lipase   Result Value Ref Range    Lipase 29 9 - 82 U/L   Lactate   Result Value Ref Range    Lactate 2.2 (H) 0.4 - 2.0 mmol/L   ECG 12 lead   Result Value Ref Range    Ventricular Rate 89 BPM    Atrial Rate 89 BPM    MS Interval 172 ms    QRS Duration 66 ms    QT Interval 372 ms    QTC Calculation(Bazett) 452 ms    P Axis 72 degrees    R Axis 30 degrees    T Axis 7 degrees    QRS Count 15 beats    Q Onset 228 ms    P Onset 142 ms    P Offset 191 ms    T Offset 414 ms    QTC Fredericia 424 ms   Urinalysis with Reflex Culture and Microscopic   Result Value Ref Range    Color, Urine Colorless (N) Light-Yellow, Yellow, Dark-Yellow    Appearance, Urine Clear Clear    Specific Gravity, Urine  1.010 1.005 - 1.035    pH, Urine 7.0 5.0, 5.5, 6.0, 6.5, 7.0, 7.5, 8.0    Protein, Urine NEGATIVE NEGATIVE, 10 (TRACE), 20 (TRACE) mg/dL    Glucose, Urine 50 (TRACE) (A) Normal mg/dL    Blood, Urine 0.1 (1+) (A) NEGATIVE    Ketones, Urine 10 (1+) (A) NEGATIVE mg/dL    Bilirubin, Urine NEGATIVE NEGATIVE    Urobilinogen, Urine Normal Normal mg/dL    Nitrite, Urine NEGATIVE NEGATIVE    Leukocyte Esterase, Urine NEGATIVE NEGATIVE   Urinalysis Microscopic   Result Value Ref Range    WBC, Urine NONE 1-5, NONE /HPF    RBC, Urine >20 (A) NONE, 1-2, 3-5 /HPF   Blood Gas Venous Full Panel   Result Value Ref Range    POCT pH, Venous 7.33 7.33 - 7.43 pH    POCT pCO2, Venous 44 41 - 51 mm Hg    POCT pO2, Venous 43 35 - 45 mm Hg    POCT SO2, Venous 74 45 - 75 %    POCT Oxy Hemoglobin, Venous 72.2 45.0 - 75.0 %    POCT Hematocrit Calculated, Venous 34.0 (L) 36.0 - 46.0 %    POCT Sodium, Venous 136 136 - 145 mmol/L    POCT Potassium, Venous 4.6 3.5 - 5.3 mmol/L    POCT Chloride, Venous 107 98 - 107 mmol/L    POCT Ionized Calicum, Venous 1.17 1.10 - 1.33 mmol/L    POCT Glucose, Venous 132 (H) 74 - 99 mg/dL    POCT Lactate, Venous 1.9 0.4 - 2.0 mmol/L    POCT Base Excess, Venous -2.8 (L) -2.0 - 3.0 mmol/L    POCT HCO3 Calculated, Venous 23.2 22.0 - 26.0 mmol/L    POCT Hemoglobin, Venous 11.3 (L) 12.0 - 16.0 g/dL    POCT Anion Gap, Venous 10.0 10.0 - 25.0 mmol/L    Patient Temperature      FiO2 24 %   Magnesium   Result Value Ref Range    Magnesium 1.89 1.60 - 2.40 mg/dL   CBC   Result Value Ref Range    WBC 11.2 4.4 - 11.3 x10*3/uL    nRBC 0.0 0.0 - 0.0 /100 WBCs    RBC 3.78 (L) 4.00 - 5.20 x10*6/uL    Hemoglobin 10.9 (L) 12.0 - 16.0 g/dL    Hematocrit 33.0 (L) 36.0 - 46.0 %    MCV 87 80 - 100 fL    MCH 28.8 26.0 - 34.0 pg    MCHC 33.0 32.0 - 36.0 g/dL    RDW 13.1 11.5 - 14.5 %    Platelets 204 150 - 450 x10*3/uL   Renal Function Panel   Result Value Ref Range    Glucose 127 (H) 74 - 99 mg/dL    Sodium 136 136 - 145 mmol/L     Potassium 4.4 3.5 - 5.3 mmol/L    Chloride 105 98 - 107 mmol/L    Bicarbonate 22 21 - 32 mmol/L    Anion Gap 13 10 - 20 mmol/L    Urea Nitrogen 28 (H) 6 - 23 mg/dL    Creatinine 1.59 (H) 0.50 - 1.05 mg/dL    eGFR 35 (L) >60 mL/min/1.73m*2    Calcium 8.5 (L) 8.6 - 10.3 mg/dL    Phosphorus 3.3 2.5 - 4.9 mg/dL    Albumin 3.8 3.4 - 5.0 g/dL   Lactate   Result Value Ref Range    Lactate 1.8 0.4 - 2.0 mmol/L   Type and screen   Result Value Ref Range    ABO TYPE O     Rh TYPE NEG     ANTIBODY SCREEN NEG    Protime-INR   Result Value Ref Range    Protime 11.0 9.8 - 12.8 seconds    INR 1.0 0.9 - 1.1   APTT   Result Value Ref Range    aPTT 29 27 - 38 seconds        Assessment/Plan     This is a 69 year old female admitted for left 4mm kidney stone with hydronephrosis, hydroureter, and LYNDA.    Recommendations:  - imaging and labs reviewed, initial leukocytosis at 16.6, LYNDA with initial creat of 1.77  - plan for cystoscopy with lithotripsy today with Dr. Hurley, timing TBD  - NPO  - symptom management per primary team  - remainder of care per primary team    Patient discussed with Dr. Mei Dougherty, APRN-CNP

## 2025-02-04 NOTE — ANESTHESIA POSTPROCEDURE EVALUATION
Patient: Stacey Davis    Procedure Summary       Date: 02/04/25 Room / Location: [facility] OR 02 / [facility]    Anesthesia Start: 1556 Anesthesia Stop:     Procedures:       URETEROSCOPY WITH STONE BASKET RETRIEVAL (Left)      CYSTOSCOPY, WITH RETROGRADE PYELOGRAM (Left) Diagnosis: (left kidney stone, hydronephrosis, hydroureter)    Surgeons: Yaneth Hurley MD Responsible Provider: Matteo Leroy MD    Anesthesia Type: general ASA Status: 3            Anesthesia Type: general    Vitals Value Taken Time   BP See vitals 02/04/25 1659   Temp  02/04/25 1659   Pulse  02/04/25 1659   Resp  02/04/25 1659   SpO2  02/04/25 1659       Anesthesia Post Evaluation    Patient location during evaluation: PACU  Patient participation: complete - patient participated  Level of consciousness: awake  Pain score: 2  Pain management: adequate  Multimodal analgesia pain management approach  Airway patency: patent  Two or more strategies used to mitigate risk of obstructive sleep apnea  Cardiovascular status: acceptable  Respiratory status: acceptable  Hydration status: acceptable  Postoperative Nausea and Vomiting: none        No notable events documented.

## 2025-02-04 NOTE — DISCHARGE INSTRUCTIONS
DEPARTMENT OF Urology  DISCHARGE INSTRUCTIONS -- Ureteroscopy Laser Lithotripsy  Outpatient Surgery    C O N F I D E N T I A L   I N F O R M A T I O N    Stacey Davis    Call [phone] during regular daytime business hours (8:00 am - 5:00 pm) and after 5:00 pm ask for the Urology resident with any questions or concerns.    If it is a life-threatening situation, proceed to the nearest emergency department.        Follow-up appointment:  please call to schedule      Thank you for the opportunity to care for you today.  Your health and healing are very important to us.  We hope we made you feel as comfortable as possible and are committed to your recovery and continued well-being.      The following is a brief overview of your Ureteroscopy Laser Lithotripsy procedure on 2/4/25. Some of the information contained on this summary may be confidential.  This information should be kept in your records and should be shared with your regular doctor.    Physicians:   Dr. Hurley    Procedure performed: Lasering of your kidney stone      Please follow up with Dr. Hurley - an appointment has been scheduled for you on 2/17/25 at 2:00 pm a the [facility] location.       What to Expect During your Recovery and Home Care  Anesthesia Side Effects   You received general anesthesia today.  You may feel sleepy, tired, or have a sore throat.   You may also feel drowsiness, dizziness, or inability to think clearly.  For your safety, do not drive, drink alcoholic beverages, take any unprescribed medication or make any important decisions for 24 hours.  A responsible adult should be with you for 24 hours.        Activity and Recovery    No heavy lifting today. Rest for the next 24 hours.    Pain Control  Unfortunately, you may experience pain after your procedure.  Frequency and urgency to urinate and mild discomfort are expected. Adequate pain management can include alternative measures to help ease your pain and that can  include over the counter Tylenol/ibuprofen and a heating pad or oxycodone which can be taken as prescribed as needed for breakthrough pain. Do not take more than 4,000mg of Tylenol in a 24-hour period.      You may have also been prescribed Pyridium (for burning sensation) and Ditropan(for bladder spasms) for pain control. Pyridium will turn your urine bright orange.    Nausea/Vomiting   Clear liquids are best tolerated at first. Start slow, advance your diet as tolerated to normal foods. Avoid spicy, greasy, heavy foods at first. Also, you may feel nauseous or like you need to vomit if you take any type of medication on an empty stomach.  Call your physician if you are unable to eat or drink and have persistent vomiting.    Signs of Bleeding   You could have some blood in your urine off and on over the next several weeks. Your urine will be light pink to yellow.    Treatment/wound care:   It is okay to shower after surgery.    Signs of Infection  Signs of infection can include fever, chills, burning sensation with passing of urine, or severe abdominal pain.  If you see any of these occur, please contact your doctor's office at [phone].  Any fever higher than 100.4, especially if associated with an ill feeling, abdominal pain, chills, or nausea should be reported to your surgeon.      Assist in bowel movements/urination  Increase fiber in diet  Increase water (6 to 8 glasses)  Increase walking   Urination should occur within 6 hours of anesthesia  If you have tried these methods and your bladder still feels full and you cannot use the bathroom, please go to your nearest Emergency room/contact your physician.    Additional Instructions:   Pieces of your kidney stone may pass in the urine for a few days and may cause some mild pain. You also had a stent placed today from your kidney down into your bladder. This helps keep the urinary tract open and prevents blockages of urine flow. The stent can be irritating and  can cause some frequency, urgency and blood in your urine when you go to the bathroom. It is important to drink plenty of water and take medications as prescribed. You may be sent home with Pyridium which turns your urine bright orange. Applying a heating pad to your back will also help with this kind of pain. It will be determined at your follow up appointment when the stent will be removed.

## 2025-02-04 NOTE — PROGRESS NOTES
02/04/25 0976   Discharge Planning   Living Arrangements Spouse/significant other   Support Systems Spouse/significant other   Assistance Needed A&Ox4, independent with ADLs, no DME, room air at baseline, no BIPAP/CPAP, drives. PCP Dr. Asia Bob   Type of Residence Private residence   Number of Stairs to Enter Residence 0   Number of Stairs Within Residence 0   Do you have animals or pets at home? Yes   Type of Animals or Pets 2 cats   Home or Post Acute Services None   Expected Discharge Disposition Home   Does the patient need discharge transport arranged? No   Financial Resource Strain   How hard is it for you to pay for the very basics like food, housing, medical care, and heating? Not hard   Housing Stability   In the last 12 months, was there a time when you were not able to pay the mortgage or rent on time? N   At any time in the past 12 months, were you homeless or living in a shelter (including now)? N   Transportation Needs   In the past 12 months, has lack of transportation kept you from medical appointments or from getting medications? no   In the past 12 months, has lack of transportation kept you from meetings, work, or from getting things needed for daily living? No   Stroke Family Assessment   Stroke Family Assessment Needed No   Intensity of Service   Intensity of Service 0-30 min

## 2025-02-04 NOTE — ED PROVIDER NOTES
HPI   Chief Complaint   Patient presents with    Abdominal Pain       Patient presents with severe abdominal pain mostly on the left lower abdomen.  Pain started earlier and seem to get little better and is been continuous.  She vomited and has had mucus when she vomits.  She has had decreased bowel movements for the past week and more difficulty with urination.  She denies any fevers or chills.  She has had previous abdominal hysterectomy and abdominoplasty.  Patient appears quite uncomfortable and slight limited historian.  History comes from patient EMR and spouse at bedside.  Limited exam due to patient discomfort.              Patient History   Past Medical History:   Diagnosis Date    Age-related osteoporosis without current pathological fracture 01/04/2023    Osteoporosis    BRCA1 negative     Nasal congestion 06/01/2021    Sinus congestion    Unspecified symptoms and signs involving the genitourinary system 11/27/2020    Urinary symptom or sign     Past Surgical History:   Procedure Laterality Date    BELT ABDOMINOPLASTY  10/15/2014    Abdominoplasty    HYSTERECTOMY  10/15/2014    Supracervical Hysterectomy    MOUTH SURGERY  10/15/2014    Oral Surgery Tooth Extraction    TONSILLECTOMY  10/15/2014    Tonsillectomy With Adenoidectomy    TOTAL ABDOMINAL HYSTERECTOMY W/ BILATERAL SALPINGOOPHORECTOMY  10/15/2014    Salpingo-oophorectomy Bilateral     Family History   Problem Relation Name Age of Onset    Breast cancer Mother      Arthritis Father      Heart attack Father  46    Breast cancer Sister  45    Breast cancer Sister  45    No Known Problems Sister      Breast cancer Maternal Grandmother  40    Glaucoma Maternal Grandfather       Social History     Tobacco Use    Smoking status: Never    Smokeless tobacco: Never   Vaping Use    Vaping status: Never Used   Substance Use Topics    Alcohol use: Never    Drug use: Never       Physical Exam   ED Triage Vitals [02/03/25 2253]   Temperature Heart Rate  Respirations BP   36.4 °C (97.5 °F) 89 (!) 22 (!) 169/97      Pulse Ox Temp Source Heart Rate Source Patient Position   100 % Temporal Monitor Sitting      BP Location FiO2 (%)     Left arm --       Physical Exam  Vitals reviewed.   Constitutional:       General: She is awake.      Appearance: She is not ill-appearing.   HENT:      Head: Normocephalic.      Nose: Nose normal.   Cardiovascular:      Rate and Rhythm: Normal rate and regular rhythm.   Pulmonary:      Effort: Pulmonary effort is normal.      Breath sounds: Normal breath sounds.   Abdominal:      Palpations: Abdomen is soft.      Comments: Tender diffusely mostly left lower abdomen.  No bruising noted   Musculoskeletal:      Cervical back: Normal range of motion.   Skin:     General: Skin is warm.      Capillary Refill: Capillary refill takes less than 2 seconds.   Neurological:      Mental Status: She is alert.           ED Course & MDM   ED Course as of 02/04/25 0227   Tue Feb 04, 2025   0001 Patient presents with severe abdominal discomfort in the left lower abdomen.  She was worked up with CT scan and labs.  She will be given pain medication and nausea medicine as well as IV fluids. [RZ]   0003 Found to have elevated white count and decreased GFR.  Will do CT scan without contrast. [RZ]   0007 EKG done at 2346 interpreted by me shows normal sinus rhythm at 89 bpm with some left atrial large and nonspecific ST-T wave abnormality no STEMI similar old EKG October 28, 2022 [RZ]   0024 Reexam patient improved.  Awaiting imaging and labs. [RZ]   0211 Patient is stable but due to her significant LYNDA will require hospitalization IV fluids.  Will contact the hospital spoke to patient and family at bedside [RZ]      ED Course User Index  [RZ] Darwin Briones MD         Diagnoses as of 02/04/25 0227   Generalized abdominal pain   LYNDA (acute kidney injury) (CMS-HCC)   Kidney stone                 No data recorded     Silvino Coma Scale Score: 15 (02/03/25  2254 : Eron Cain RN)                           Medical Decision Making      Procedure  Procedures     Darwin Briones MD  02/04/25 0221

## 2025-02-04 NOTE — ANESTHESIA PROCEDURE NOTES
Airway  Date/Time: 2/4/2025 4:03 PM  Urgency: elective    Airway not difficult    Staffing  Performed: CRNA   Authorized by: Matteo Leroy MD    Performed by: JUDI Coley-YAMILA  Patient location during procedure: OR    Indications and Patient Condition  Indications for airway management: anesthesia and airway protection  Spontaneous ventilation: present  Sedation level: deep  Preoxygenated: yes  Patient position: sniffing  MILS maintained throughout  Mask difficulty assessment: 0 - not attempted  No planned trial extubation    Final Airway Details  Final airway type: supraglottic airway      Successful airway: Size 4     Number of attempts at approach: 1  Number of other approaches attempted: 0

## 2025-02-04 NOTE — PROGRESS NOTES
Patient: Stacey Davis  Room/bed: 135/135-A  Admitted on: 2/3/2025    Age: 69 y.o.   Gender: female  Code Status:  Full Code   Admitting Dx: Kidney stone [N20.0]  Generalized abdominal pain [R10.84]  LYNDA (acute kidney injury) (CMS-HCC) [N17.9]    MRN: [id number]  PCP: Asia Bob MD       Subjective   Seen and examined in her room this AM. Awake and alert, resting in bed. In acute pain in the left flank. No urinary complains. Anticipates procedure this afternoon. No BM x 7 days (states not unusual for her).     Objective    Physical Exam   Constitutional: A&O x 3; mild distress related to pain;  calm and cooperative  Eyes: EOM's intact  HEENT: Normocephalic, Atraumatic. Oral mucosa moist.   Neck: Supple. No JVD, lymphadenopathy.   Lungs: CTAB with fair air movement. Respirations even and unlabored on room air.   Heart: RRR  Abdomen: Soft, left-sided tenderness, non-distended, +BS  MS/Extremities: BAUMAN equally x 4. No edema. Peripheral pulses intact bilaterally.   Neuro: A&O x3; no focal deficits; gross motor and sensation intact.   Skin: Warm and dry. No rashes or lesions  Psych: Normal affect.      Temp:  [36.3 °C (97.3 °F)-36.8 °C (98.2 °F)] 36.6 °C (97.9 °F)  Heart Rate:  [80-97] 80  Resp:  [12-22] 16  BP: (123-169)/(70-97) 133/79    Vitals:    02/04/25 0310   Weight: 58.2 kg (128 lb 3.2 oz)             I/Os    Intake/Output Summary (Last 24 hours) at 2/4/2025 1350  Last data filed at 2/4/2025 0900  Gross per 24 hour   Intake 4240 ml   Output --   Net 4240 ml       Labs:   Results from last 72 hours   Lab Units 02/04/25  0524 02/03/25  2310   SODIUM mmol/L 136 136   POTASSIUM mmol/L 4.4 3.8   CHLORIDE mmol/L 105 100   CO2 mmol/L 22 20*   BUN mg/dL 28* 35*   CREATININE mg/dL 1.59* 1.77*   GLUCOSE mg/dL 127* 150*   CALCIUM mg/dL 8.5* 10.0   ANION GAP mmol/L 13 20   EGFR mL/min/1.73m*2 35* 31*   PHOSPHORUS mg/dL 3.3  --       Results from last 72 hours   Lab Units 02/04/25  0524 02/03/25  2310   WBC AUTO  x10*3/uL 11.2 16.6*   HEMOGLOBIN g/dL 10.9* 11.9*   HEMATOCRIT % 33.0* 35.2*   PLATELETS AUTO x10*3/uL 204 295   NEUTROS PCT AUTO %  --  85.0   LYMPHS PCT AUTO %  --  8.4   MONOS PCT AUTO %  --  5.6   EOS PCT AUTO %  --  0.2      Lab Results   Component Value Date    CALCIUM 8.5 (L) 2025    PHOS 3.3 2025      Lab Results   Component Value Date    CRP 0.63 10/17/2023      Micro/ID:   No results found for the last 90 days.     .ID  Lab Results   Component Value Date    URINECULTURE No significant growth 2024       Images:  CT A/P:   Impression: Left hydronephrosis and hydroureter with a 4 mm calculus in the  distal left ureter at the ureteral vesicular junction.     Meds    Scheduled medications  heparin (porcine), 5,000 Units, subcutaneous, q8h SULEMAN  oxygen, , inhalation, Continuous - Inhalation  tamsulosin, 0.4 mg, oral, Daily      Continuous medications  lactated Ringer's, 100 mL/hr, Last Rate: 100 mL/hr (25 1128)      PRN medications  PRN medications: ondansetron, oxyCODONE, oxyCODONE     Assessment and Plan    Stacey Davis is a 69 y.o. female with no significant medical history who presented to ED with c/o left-sided flank pain. CT A/P demonstrated left hydronephrosis and dydroureter with 4 mm calculus in left UVJ. BMP concerning for LYNDA.     Left Renal Calculi with Hydronephrosis/Hydroureter and LYNDA  -CT A/P as noted above  -UA 1+ blood; negative leukocytes esterace  -Urology consulted - planning cystoscopy with lithotripsy this afternoon  -On IVF, Tamsulosin, Zofran PRN  -Medicate for pain: Oxycodone, Dilaudid for BTP  -Creatinine downtrendiin.77>1.59  -BMP in AM    Constipation  -Will add bowel regimen and monitor    DVT Prophylaxis  -Heparin    Fluids/Electrolytes/Nutrition  -Laboratory data reviewed.   -Electrolytes stable. Renal insufficiency as noted above.   -No nutritional concerns at this time.      Disposition  -Plan of care discussed with attending, Dr. Neves.   -Home  in AM if clinically stable.       Alison Tellez, APRN-CNP

## 2025-02-04 NOTE — OP NOTE
URETEROSCOPY WITH STONE BASKET RETRIEVAL (L), CYSTOSCOPY, WITH RETROGRADE PYELOGRAM (L) Operative Note     Date: 2025  OR Location: [facility] OR    Name: Stacey Davis, : 1955, Age: 69 y.o., MRN: [id number], Sex: female    Diagnosis  Pre-op Diagnosis      * Calculus of distal left ureter [N20.1] Post-op Diagnosis     * Calculus of distal left ureter [N20.1]     Procedures  URETEROSCOPY WITH STONE BASKET RETRIEVAL  30656 - MA CYSTO/URETERO W/EXTRACTION STONE    10331- URETERAL STENT INSERTION    CYSTOSCOPY, WITH RETROGRADE PYELOGRAM  83645 - CHG UROGRAPHY RETROGRADE WITH/WO KUB      Surgeons      * Yaneth Hurley - Primary    Resident/Fellow/Other Assistant:  Ed Cox MD    Staff:   Circulator: Latasha Sal Person: Arabella  Surgical Assistant: Rashad Umanzor Scrub: Jeremy Umanzor Scrub: Chet    Anesthesia Staff: Anesthesiologist: Matteo Leroy MD  CRNA: JUDI Coley-CRNA    Procedure Summary  Anesthesia: General  ASA: III  Estimated Blood Loss: 0mL  Intra-op Medications: Administrations occurring from 1705 to 1920 on 25:  * No intraprocedure medications in log *        Specimen:   ID Type Source Tests Collected by Time   A : LEFT URETER STONE Calculus Ureter, Left CALCULI (STONE) ANALYSIS Yaneth Hurley MD 2025 1619                 Drains and/or Catheters: * None in log *    Tourniquet Times:         Implants:  Implants       Type Name Action Serial No.      Implant STENT KIT, IMAJIN HYDRO, 6FR X 24CM, POSITIONER, NO GUIDEWIRE - [id number] Implanted               Findings: 4mm stone at the L UVJ successfully extracted with basket via semi rigid ureteroscope; 6x24 JJ stent placed, proximal ureter was very tortuous and proximal stent curl was unable to remain fully in renal pelvis    Indications: Stacey Davis is an 69 y.o. female who is having surgery for left kidney stone, hydronephrosis, hydroureter.     The patient was seen in the preoperative area. The risks,  benefits, complications, treatment options, non-operative alternatives, expected recovery and outcomes were discussed with the patient. The possibilities of reaction to medication, pulmonary aspiration, injury to surrounding structures, bleeding, recurrent infection, the need for additional procedures, failure to diagnose a condition, and creating a complication requiring transfusion or operation were discussed with the patient. The patient concurred with the proposed plan, giving informed consent.  The site of surgery was properly noted/marked if necessary per policy. The patient has been actively warmed in preoperative area. Preoperative antibiotics have been ordered and given within 1 hours of incision. Venous thrombosis prophylaxis are not indicated.    Procedure Details:     Patient was consented and antibiotics were started on call to OR.  In the operating room, under general anesthesia, with the patient in dorsolithotomy position, genitalia was prepped and draped in the usual manner.  No.22 cystoscope was inserted down the urethra into the bladder, and cystoscopy revealed no stones or tumors. The ureteral orifices were identified in the normal orthotopic position with the 4mm stone noted in the left UVJ.  The dual catheter was advanced through the cystoscope and a guidewire was inserted through the left ureteral orifice.  The ureteral catheter and the cystoscope were removed and the rigid ureteroscope was inserted parallel to the wire into the left ureter, and advanced to the level of the left-ureter where the stone was seen.  The stone was then grasped with a basket.     Contrast was injected through the ureteroscope visualizing the ureter and the renal pelvis.  There was moderate hydronephrosis with a tortuous proximal ureter with the tip of the wire not completely within the renal pelvis.    The stone was completely extracted and the ureteroscope was removed. The wire was repositioned that the tip was just  in the renal pelvis using the dual lumen catheter as a rigid guide. A 6x24JJ stent was advanced over the wire and under fluoroscopic vision the proximal curl was initially within the renal pelvis. The distal curl was then found to be in the bladder but on reinspection the proximal curl had slipped into the proximal tortuous ureter.     Stone fragments were sent for qualitative analysis.  This concluded the procedure.    Patient tolerated the procedure well and was transferred to PACU in stable condition.    Complications:  None; patient tolerated the procedure well.    Disposition: PACU - hemodynamically stable.  Condition: stable         Task Performed by RNFA or Surgical Assistant:  none          Additional Details: All stones removed. Stent will be removed in the office.     Attending Attestation: I was present for the entire procedure    Yaneth Hurley  Phone Number: [phone]

## 2025-02-04 NOTE — CONSULTS
Reason For Consult  4 mm left kidney stone with hydronephrosis, LYNDA    History Of Present Illness  Stacey Davis is a 69 y.o. female with past medical history significant for HTN and HLD who presented to [facility] 2/3 for left flank pain. Patient reports pain started yesterday morning and progressively worsened throughout the day. She reports associated nausea, vomiting, and chills. Denies previous history of kidney stones.     In the ED, patient hemodynamically stable, afebrile. Labs remarkable for creat 1.77, lactate 2.2, WBC 16.6. Urine negative for infection. CT a/p signficant for left hydronephrosis and hydroureter with a 4 mm calculus in the distal left ureter at the UVJ. Patient admitted to medicine with urology consult for further management.      Past Medical History  She has a past medical history of Age-related osteoporosis without current pathological fracture (01/04/2023), BRCA1 negative, Nasal congestion (06/01/2021), and Unspecified symptoms and signs involving the genitourinary system (11/27/2020).    Surgical History  She has a past surgical history that includes Total abdominal hysterectomy w/ bilateral salpingoophorectomy (10/15/2014); Hysterectomy (10/15/2014); Tonsillectomy (10/15/2014); Mouth surgery (10/15/2014); and Belt abdominoplasty (10/15/2014).     Social History  She reports that she has never smoked. She has never used smokeless tobacco. She reports that she does not drink alcohol and does not use drugs.    Family History  Family History   Problem Relation Name Age of Onset    Breast cancer Mother      Arthritis Father      Heart attack Father  46    Breast cancer Sister  45    Breast cancer Sister  45    No Known Problems Sister      Breast cancer Maternal Grandmother  40    Glaucoma Maternal Grandfather          Allergies  Alendronate and Statins-hmg-coa reductase inhibitors    Physical Exam  Physical Exam  Constitutional:       General: She is not in acute  "distress.  Cardiovascular:      Rate and Rhythm: Normal rate.   Abdominal:      General: There is no distension.      Palpations: Abdomen is soft.      Comments: Mild tenderness on palpation over left abd and left lower back.    Skin:     General: Skin is warm and dry.   Neurological:      Mental Status: She is alert and oriented to person, place, and time.            Last Recorded Vitals  Blood pressure 139/83, pulse 86, temperature 36.8 °C (98.2 °F), temperature source Temporal, resp. rate 16, height 1.575 m (5' 2\"), weight 58.2 kg (128 lb 3.2 oz), SpO2 95%.    Relevant Results      Results for orders placed or performed during the hospital encounter of 02/03/25 (from the past 24 hours)   CBC with Differential   Result Value Ref Range    WBC 16.6 (H) 4.4 - 11.3 x10*3/uL    nRBC 0.0 0.0 - 0.0 /100 WBCs    RBC 4.08 4.00 - 5.20 x10*6/uL    Hemoglobin 11.9 (L) 12.0 - 16.0 g/dL    Hematocrit 35.2 (L) 36.0 - 46.0 %    MCV 86 80 - 100 fL    MCH 29.2 26.0 - 34.0 pg    MCHC 33.8 32.0 - 36.0 g/dL    RDW 13.2 11.5 - 14.5 %    Platelets 295 150 - 450 x10*3/uL    Neutrophils % 85.0 40.0 - 80.0 %    Immature Granulocytes %, Automated 0.4 0.0 - 0.9 %    Lymphocytes % 8.4 13.0 - 44.0 %    Monocytes % 5.6 2.0 - 10.0 %    Eosinophils % 0.2 0.0 - 6.0 %    Basophils % 0.4 0.0 - 2.0 %    Neutrophils Absolute 14.14 (H) 1.20 - 7.70 x10*3/uL    Immature Granulocytes Absolute, Automated 0.07 0.00 - 0.70 x10*3/uL    Lymphocytes Absolute 1.40 1.20 - 4.80 x10*3/uL    Monocytes Absolute 0.93 0.10 - 1.00 x10*3/uL    Eosinophils Absolute 0.03 0.00 - 0.70 x10*3/uL    Basophils Absolute 0.06 0.00 - 0.10 x10*3/uL   Comprehensive Metabolic Panel   Result Value Ref Range    Glucose 150 (H) 74 - 99 mg/dL    Sodium 136 136 - 145 mmol/L    Potassium 3.8 3.5 - 5.3 mmol/L    Chloride 100 98 - 107 mmol/L    Bicarbonate 20 (L) 21 - 32 mmol/L    Anion Gap 20 10 - 20 mmol/L    Urea Nitrogen 35 (H) 6 - 23 mg/dL    Creatinine 1.77 (H) 0.50 - 1.05 mg/dL    eGFR " 31 (L) >60 mL/min/1.73m*2    Calcium 10.0 8.6 - 10.3 mg/dL    Albumin 4.3 3.4 - 5.0 g/dL    Alkaline Phosphatase 83 33 - 136 U/L    Total Protein 7.9 6.4 - 8.2 g/dL    AST 20 9 - 39 U/L    Bilirubin, Total 0.5 0.0 - 1.2 mg/dL    ALT 14 7 - 45 U/L   Lipase   Result Value Ref Range    Lipase 29 9 - 82 U/L   Lactate   Result Value Ref Range    Lactate 2.2 (H) 0.4 - 2.0 mmol/L   ECG 12 lead   Result Value Ref Range    Ventricular Rate 89 BPM    Atrial Rate 89 BPM    NY Interval 172 ms    QRS Duration 66 ms    QT Interval 372 ms    QTC Calculation(Bazett) 452 ms    P Axis 72 degrees    R Axis 30 degrees    T Axis 7 degrees    QRS Count 15 beats    Q Onset 228 ms    P Onset 142 ms    P Offset 191 ms    T Offset 414 ms    QTC Fredericia 424 ms   Urinalysis with Reflex Culture and Microscopic   Result Value Ref Range    Color, Urine Colorless (N) Light-Yellow, Yellow, Dark-Yellow    Appearance, Urine Clear Clear    Specific Gravity, Urine 1.010 1.005 - 1.035    pH, Urine 7.0 5.0, 5.5, 6.0, 6.5, 7.0, 7.5, 8.0    Protein, Urine NEGATIVE NEGATIVE, 10 (TRACE), 20 (TRACE) mg/dL    Glucose, Urine 50 (TRACE) (A) Normal mg/dL    Blood, Urine 0.1 (1+) (A) NEGATIVE    Ketones, Urine 10 (1+) (A) NEGATIVE mg/dL    Bilirubin, Urine NEGATIVE NEGATIVE    Urobilinogen, Urine Normal Normal mg/dL    Nitrite, Urine NEGATIVE NEGATIVE    Leukocyte Esterase, Urine NEGATIVE NEGATIVE   Urinalysis Microscopic   Result Value Ref Range    WBC, Urine NONE 1-5, NONE /HPF    RBC, Urine >20 (A) NONE, 1-2, 3-5 /HPF   Blood Gas Venous Full Panel   Result Value Ref Range    POCT pH, Venous 7.33 7.33 - 7.43 pH    POCT pCO2, Venous 44 41 - 51 mm Hg    POCT pO2, Venous 43 35 - 45 mm Hg    POCT SO2, Venous 74 45 - 75 %    POCT Oxy Hemoglobin, Venous 72.2 45.0 - 75.0 %    POCT Hematocrit Calculated, Venous 34.0 (L) 36.0 - 46.0 %    POCT Sodium, Venous 136 136 - 145 mmol/L    POCT Potassium, Venous 4.6 3.5 - 5.3 mmol/L    POCT Chloride, Venous 107 98 - 107 mmol/L     POCT Ionized Calicum, Venous 1.17 1.10 - 1.33 mmol/L    POCT Glucose, Venous 132 (H) 74 - 99 mg/dL    POCT Lactate, Venous 1.9 0.4 - 2.0 mmol/L    POCT Base Excess, Venous -2.8 (L) -2.0 - 3.0 mmol/L    POCT HCO3 Calculated, Venous 23.2 22.0 - 26.0 mmol/L    POCT Hemoglobin, Venous 11.3 (L) 12.0 - 16.0 g/dL    POCT Anion Gap, Venous 10.0 10.0 - 25.0 mmol/L    Patient Temperature      FiO2 24 %   Magnesium   Result Value Ref Range    Magnesium 1.89 1.60 - 2.40 mg/dL   CBC   Result Value Ref Range    WBC 11.2 4.4 - 11.3 x10*3/uL    nRBC 0.0 0.0 - 0.0 /100 WBCs    RBC 3.78 (L) 4.00 - 5.20 x10*6/uL    Hemoglobin 10.9 (L) 12.0 - 16.0 g/dL    Hematocrit 33.0 (L) 36.0 - 46.0 %    MCV 87 80 - 100 fL    MCH 28.8 26.0 - 34.0 pg    MCHC 33.0 32.0 - 36.0 g/dL    RDW 13.1 11.5 - 14.5 %    Platelets 204 150 - 450 x10*3/uL   Renal Function Panel   Result Value Ref Range    Glucose 127 (H) 74 - 99 mg/dL    Sodium 136 136 - 145 mmol/L    Potassium 4.4 3.5 - 5.3 mmol/L    Chloride 105 98 - 107 mmol/L    Bicarbonate 22 21 - 32 mmol/L    Anion Gap 13 10 - 20 mmol/L    Urea Nitrogen 28 (H) 6 - 23 mg/dL    Creatinine 1.59 (H) 0.50 - 1.05 mg/dL    eGFR 35 (L) >60 mL/min/1.73m*2    Calcium 8.5 (L) 8.6 - 10.3 mg/dL    Phosphorus 3.3 2.5 - 4.9 mg/dL    Albumin 3.8 3.4 - 5.0 g/dL   Lactate   Result Value Ref Range    Lactate 1.8 0.4 - 2.0 mmol/L   Type and screen   Result Value Ref Range    ABO TYPE O     Rh TYPE NEG     ANTIBODY SCREEN NEG    Protime-INR   Result Value Ref Range    Protime 11.0 9.8 - 12.8 seconds    INR 1.0 0.9 - 1.1   APTT   Result Value Ref Range    aPTT 29 27 - 38 seconds     ECG 12 lead    Result Date: 2/4/2025  Normal sinus rhythm Possible Left atrial enlargement Nonspecific ST and T wave abnormality Abnormal ECG When compared with ECG of 28-OCT-2022 09:38, Nonspecific T wave abnormality now evident in Anterior leads    CT abdomen pelvis wo IV contrast    Result Date: 2/4/2025  Interpreted By:  Pavan Christine, STUDY:  CT ABDOMEN PELVIS WO IV CONTRAST;  2/4/2025 12:41 am   INDICATION: Signs/Symptoms:LLQ abd pain.   COMPARISON: None.   ACCESSION NUMBER(S): [id number]   ORDERING CLINICIAN: JESUS KENNEDY   TECHNIQUE: Contiguous axial images of the abdomen and pelvis were obtained without intravenous contrast. Coronal and sagittal reformatted images were obtained from the axial images.   FINDINGS: There is limited evaluation of the lung bases. Bibasilar subsegmental atelectasis. Small hiatal hernia.   Evaluation of the abdominal viscera is limited secondary to lack of intravenous contrast. Limited evaluation for liver mass on noncontrast examination. The gallbladder is present. No dilatation common bile duct.   The pancreas, spleen, and adrenal glands appear unremarkable.   3.1 cm cyst in the right kidney. There is left hydronephrosis and hydroureter with a 4 mm calculus in the distal left ureter at the ureteral vesicular junction.   No evidence of bowel obstruction or acute appendicitis.   Urinary bladder appears unremarkable.   There is scoliotic curvature and degenerative change of the lumbar spine.       Left hydronephrosis and hydroureter with a 4 mm calculus in the distal left ureter at the ureteral vesicular junction.   MACRO: None   Signed by: Pavan Christine 2/4/2025 1:58 AM Dictation workstation:   [id number]         Scheduled medications  heparin (porcine), 5,000 Units, subcutaneous, q8h SULEMAN  oxygen, , inhalation, Continuous - Inhalation  tamsulosin, 0.4 mg, oral, Daily      Continuous medications     PRN medications  PRN medications: ondansetron, oxyCODONE, oxyCODONE  Results for orders placed or performed during the hospital encounter of 02/03/25 (from the past 24 hours)   CBC with Differential   Result Value Ref Range    WBC 16.6 (H) 4.4 - 11.3 x10*3/uL    nRBC 0.0 0.0 - 0.0 /100 WBCs    RBC 4.08 4.00 - 5.20 x10*6/uL    Hemoglobin 11.9 (L) 12.0 - 16.0 g/dL    Hematocrit 35.2 (L) 36.0 - 46.0 %    MCV 86 80 - 100 fL     MCH 29.2 26.0 - 34.0 pg    MCHC 33.8 32.0 - 36.0 g/dL    RDW 13.2 11.5 - 14.5 %    Platelets 295 150 - 450 x10*3/uL    Neutrophils % 85.0 40.0 - 80.0 %    Immature Granulocytes %, Automated 0.4 0.0 - 0.9 %    Lymphocytes % 8.4 13.0 - 44.0 %    Monocytes % 5.6 2.0 - 10.0 %    Eosinophils % 0.2 0.0 - 6.0 %    Basophils % 0.4 0.0 - 2.0 %    Neutrophils Absolute 14.14 (H) 1.20 - 7.70 x10*3/uL    Immature Granulocytes Absolute, Automated 0.07 0.00 - 0.70 x10*3/uL    Lymphocytes Absolute 1.40 1.20 - 4.80 x10*3/uL    Monocytes Absolute 0.93 0.10 - 1.00 x10*3/uL    Eosinophils Absolute 0.03 0.00 - 0.70 x10*3/uL    Basophils Absolute 0.06 0.00 - 0.10 x10*3/uL   Comprehensive Metabolic Panel   Result Value Ref Range    Glucose 150 (H) 74 - 99 mg/dL    Sodium 136 136 - 145 mmol/L    Potassium 3.8 3.5 - 5.3 mmol/L    Chloride 100 98 - 107 mmol/L    Bicarbonate 20 (L) 21 - 32 mmol/L    Anion Gap 20 10 - 20 mmol/L    Urea Nitrogen 35 (H) 6 - 23 mg/dL    Creatinine 1.77 (H) 0.50 - 1.05 mg/dL    eGFR 31 (L) >60 mL/min/1.73m*2    Calcium 10.0 8.6 - 10.3 mg/dL    Albumin 4.3 3.4 - 5.0 g/dL    Alkaline Phosphatase 83 33 - 136 U/L    Total Protein 7.9 6.4 - 8.2 g/dL    AST 20 9 - 39 U/L    Bilirubin, Total 0.5 0.0 - 1.2 mg/dL    ALT 14 7 - 45 U/L   Lipase   Result Value Ref Range    Lipase 29 9 - 82 U/L   Lactate   Result Value Ref Range    Lactate 2.2 (H) 0.4 - 2.0 mmol/L   ECG 12 lead   Result Value Ref Range    Ventricular Rate 89 BPM    Atrial Rate 89 BPM    MO Interval 172 ms    QRS Duration 66 ms    QT Interval 372 ms    QTC Calculation(Bazett) 452 ms    P Axis 72 degrees    R Axis 30 degrees    T Axis 7 degrees    QRS Count 15 beats    Q Onset 228 ms    P Onset 142 ms    P Offset 191 ms    T Offset 414 ms    QTC Fredericia 424 ms   Urinalysis with Reflex Culture and Microscopic   Result Value Ref Range    Color, Urine Colorless (N) Light-Yellow, Yellow, Dark-Yellow    Appearance, Urine Clear Clear    Specific Gravity, Urine  1.010 1.005 - 1.035    pH, Urine 7.0 5.0, 5.5, 6.0, 6.5, 7.0, 7.5, 8.0    Protein, Urine NEGATIVE NEGATIVE, 10 (TRACE), 20 (TRACE) mg/dL    Glucose, Urine 50 (TRACE) (A) Normal mg/dL    Blood, Urine 0.1 (1+) (A) NEGATIVE    Ketones, Urine 10 (1+) (A) NEGATIVE mg/dL    Bilirubin, Urine NEGATIVE NEGATIVE    Urobilinogen, Urine Normal Normal mg/dL    Nitrite, Urine NEGATIVE NEGATIVE    Leukocyte Esterase, Urine NEGATIVE NEGATIVE   Urinalysis Microscopic   Result Value Ref Range    WBC, Urine NONE 1-5, NONE /HPF    RBC, Urine >20 (A) NONE, 1-2, 3-5 /HPF   Blood Gas Venous Full Panel   Result Value Ref Range    POCT pH, Venous 7.33 7.33 - 7.43 pH    POCT pCO2, Venous 44 41 - 51 mm Hg    POCT pO2, Venous 43 35 - 45 mm Hg    POCT SO2, Venous 74 45 - 75 %    POCT Oxy Hemoglobin, Venous 72.2 45.0 - 75.0 %    POCT Hematocrit Calculated, Venous 34.0 (L) 36.0 - 46.0 %    POCT Sodium, Venous 136 136 - 145 mmol/L    POCT Potassium, Venous 4.6 3.5 - 5.3 mmol/L    POCT Chloride, Venous 107 98 - 107 mmol/L    POCT Ionized Calicum, Venous 1.17 1.10 - 1.33 mmol/L    POCT Glucose, Venous 132 (H) 74 - 99 mg/dL    POCT Lactate, Venous 1.9 0.4 - 2.0 mmol/L    POCT Base Excess, Venous -2.8 (L) -2.0 - 3.0 mmol/L    POCT HCO3 Calculated, Venous 23.2 22.0 - 26.0 mmol/L    POCT Hemoglobin, Venous 11.3 (L) 12.0 - 16.0 g/dL    POCT Anion Gap, Venous 10.0 10.0 - 25.0 mmol/L    Patient Temperature      FiO2 24 %   Magnesium   Result Value Ref Range    Magnesium 1.89 1.60 - 2.40 mg/dL   CBC   Result Value Ref Range    WBC 11.2 4.4 - 11.3 x10*3/uL    nRBC 0.0 0.0 - 0.0 /100 WBCs    RBC 3.78 (L) 4.00 - 5.20 x10*6/uL    Hemoglobin 10.9 (L) 12.0 - 16.0 g/dL    Hematocrit 33.0 (L) 36.0 - 46.0 %    MCV 87 80 - 100 fL    MCH 28.8 26.0 - 34.0 pg    MCHC 33.0 32.0 - 36.0 g/dL    RDW 13.1 11.5 - 14.5 %    Platelets 204 150 - 450 x10*3/uL   Renal Function Panel   Result Value Ref Range    Glucose 127 (H) 74 - 99 mg/dL    Sodium 136 136 - 145 mmol/L     Potassium 4.4 3.5 - 5.3 mmol/L    Chloride 105 98 - 107 mmol/L    Bicarbonate 22 21 - 32 mmol/L    Anion Gap 13 10 - 20 mmol/L    Urea Nitrogen 28 (H) 6 - 23 mg/dL    Creatinine 1.59 (H) 0.50 - 1.05 mg/dL    eGFR 35 (L) >60 mL/min/1.73m*2    Calcium 8.5 (L) 8.6 - 10.3 mg/dL    Phosphorus 3.3 2.5 - 4.9 mg/dL    Albumin 3.8 3.4 - 5.0 g/dL   Lactate   Result Value Ref Range    Lactate 1.8 0.4 - 2.0 mmol/L   Type and screen   Result Value Ref Range    ABO TYPE O     Rh TYPE NEG     ANTIBODY SCREEN NEG    Protime-INR   Result Value Ref Range    Protime 11.0 9.8 - 12.8 seconds    INR 1.0 0.9 - 1.1   APTT   Result Value Ref Range    aPTT 29 27 - 38 seconds        Assessment/Plan     This is a 69 year old female admitted for left 4mm kidney stone with hydronephrosis, hydroureter, and LYNDA.    Recommendations:  - imaging and labs reviewed, initial leukocytosis at 16.6, LYNDA with initial creat of 1.77  - plan for cystoscopy with lithotripsy today with Dr. Hurley, timing TBD  - NPO  - symptom management per primary team  - remainder of care per primary team    Patient discussed with Dr. Mei Dougherty, APRN-CNP

## 2025-02-04 NOTE — ANESTHESIA PREPROCEDURE EVALUATION
Patient: Stacey Davis    Procedure Information       Date/Time: 02/04/25 1705    Procedures:       CYSTOSCOPY, WITH LASER LITHOTRIPSY (Left)      CYSTOSCOPY, WITH RETROGRADE PYELOGRAM (Left)    Location: [facility] / [facility]    Surgeons: Yaneth Hurley MD          Vitals:    02/04/25 1517   BP: 157/85   Pulse: 90   Resp: 16   Temp: 36.6 °C (97.9 °F)   SpO2: (!) 86%       Past Surgical History:   Procedure Laterality Date    BELT ABDOMINOPLASTY  10/15/2014    Abdominoplasty    HYSTERECTOMY  10/15/2014    Supracervical Hysterectomy    MOUTH SURGERY  10/15/2014    Oral Surgery Tooth Extraction    TONSILLECTOMY  10/15/2014    Tonsillectomy With Adenoidectomy    TOTAL ABDOMINAL HYSTERECTOMY W/ BILATERAL SALPINGOOPHORECTOMY  10/15/2014    Salpingo-oophorectomy Bilateral     Past Medical History:   Diagnosis Date    Age-related osteoporosis without current pathological fracture 01/04/2023    Osteoporosis    Anxiety     BRCA1 negative     Double vision 04/09/2024    Hydronephrosis of left kidney 02/03/2025    Hydroureter on left 02/03/2025    Hypercholesteremia     Hypertension     Kidney stone on left side 02/03/2025    Nasal congestion 06/01/2021    Sinus congestion    Stenosis of cervical spine     Thyroid nodule     TMJ arthropathy     Unspecified symptoms and signs involving the genitourinary system 11/27/2020    Urinary symptom or sign       Current Facility-Administered Medications:     [Transfer Hold] docusate sodium (Colace) capsule 100 mg, 100 mg, oral, BID, Alison Tellez APRN-CNP    heparin (porcine) injection 5,000 Units, 5,000 Units, subcutaneous, q8h SULEMAN, Carlos Eduardo Adames DO, 5,000 Units at 02/04/25 0532    lactated Ringer's infusion, 100 mL/hr, intravenous, Continuous, JUDI Cabrera-CNP, Last Rate: 100 mL/hr at 02/04/25 1128, 100 mL/hr at 02/04/25 1128    ondansetron (Zofran) tablet 4 mg, 4 mg, oral, q8h PRN, Carlos Eduardo Adames DO    oxyCODONE (Roxicodone) immediate release tablet 10 mg, 10  mg, oral, q6h PRN, Carlos Eduardo Adames DO, 10 mg at 02/04/25 0425    oxyCODONE (Roxicodone) immediate release tablet 5 mg, 5 mg, oral, q6h PRN, Carlos Eduardo Adames DO, 5 mg at 02/04/25 0851    oxygen (O2) therapy, , inhalation, Continuous - Inhalation, Darwin Briones MD, Last Rate: 60,000 mL/hr at 02/04/25 1021, 1 L/min at 02/04/25 1021    [Transfer Hold] polyethylene glycol (Glycolax, Miralax) packet 17 g, 17 g, oral, Daily, Alison Tellez, APRN-CNP    tamsulosin (Flomax) 24 hr capsule 0.4 mg, 0.4 mg, oral, Daily, Carlos Eduardo Adames DO, 0.4 mg at 02/04/25 0851  Prior to Admission medications    Medication Sig Start Date End Date Taking? Authorizing Provider   amlodipine-olmesartan (Helen) 5-20 mg tablet TAKE ONE TABLET BY MOUTH DAILY 8/22/24  Yes Asia Bob MD   cholecalciferol, vitamin D3, (VITAMIN D3 ORAL)    Yes Historical Provider, MD   DULoxetine (Cymbalta) 30 mg DR capsule TAKE 1 CAPSULE (30 MG) BY MOUTH ONCE DAILY. DO NOT CRUSH OR CHEW. 6/11/24  Yes Asia Bob MD   vitamin K2, MK-4, 100 mcg tablet Take by mouth.   Yes Historical Provider, MD   Praluent Pen 150 mg/mL pen injector INJECT 150 MG (ONE PEN) UNDER THE SKIN ONCE EVERY TWO WEEKS. 12/17/23 12/16/24  Lopez Baldwin MD     Allergies   Allergen Reactions    Alendronate Unknown    Statins-Hmg-Coa Reductase Inhibitors Unknown     Social History     Tobacco Use    Smoking status: Never    Smokeless tobacco: Never   Substance Use Topics    Alcohol use: Never         Chemistry    Lab Results   Component Value Date/Time     02/04/2025 0524    K 4.4 02/04/2025 0524     02/04/2025 0524    CO2 22 02/04/2025 0524    BUN 28 (H) 02/04/2025 0524    CREATININE 1.59 (H) 02/04/2025 0524    Lab Results   Component Value Date/Time    CALCIUM 8.5 (L) 02/04/2025 0524    ALKPHOS 83 02/03/2025 2310    AST 20 02/03/2025 2310    ALT 14 02/03/2025 2310    BILITOT 0.5 02/03/2025 2310          Lab Results   Component Value Date/Time    WBC 11.2 02/04/2025  0524    HGB 10.9 (L) 02/04/2025 0524    HCT 33.0 (L) 02/04/2025 0524     02/04/2025 0524     Lab Results   Component Value Date/Time    PROTIME 11.0 02/04/2025 0524    INR 1.0 02/04/2025 0524     Encounter Date: 02/03/25   ECG 12 lead   Result Value    Ventricular Rate 89    Atrial Rate 89    OH Interval 172    QRS Duration 66    QT Interval 372    QTC Calculation(Bazett) 452    P Axis 72    R Axis 30    T Axis 7    QRS Count 15    Q Onset 228    P Onset 142    P Offset 191    T Offset 414    QTC Fredericia 424    Narrative    Normal sinus rhythm  Possible Left atrial enlargement  Nonspecific ST and T wave abnormality  Abnormal ECG  When compared with ECG of 28-OCT-2022 09:38,  Nonspecific T wave abnormality now evident in Anterior leads     No results found for this or any previous visit from the past 1095 days.      Vitals:    02/04/25 1517   BP: 157/85   Pulse: 90   Resp: 16   Temp: 36.6 °C (97.9 °F)   SpO2: (!) 86%       Past Surgical History:   Procedure Laterality Date    BELT ABDOMINOPLASTY  10/15/2014    Abdominoplasty    HYSTERECTOMY  10/15/2014    Supracervical Hysterectomy    MOUTH SURGERY  10/15/2014    Oral Surgery Tooth Extraction    TONSILLECTOMY  10/15/2014    Tonsillectomy With Adenoidectomy    TOTAL ABDOMINAL HYSTERECTOMY W/ BILATERAL SALPINGOOPHORECTOMY  10/15/2014    Salpingo-oophorectomy Bilateral     Past Medical History:   Diagnosis Date    Age-related osteoporosis without current pathological fracture 01/04/2023    Osteoporosis    Anxiety     BRCA1 negative     Double vision 04/09/2024    Hydronephrosis of left kidney 02/03/2025    Hydroureter on left 02/03/2025    Hypercholesteremia     Hypertension     Kidney stone on left side 02/03/2025    Nasal congestion 06/01/2021    Sinus congestion    Stenosis of cervical spine     Thyroid nodule     TMJ arthropathy     Unspecified symptoms and signs involving the genitourinary system 11/27/2020    Urinary symptom or sign       Current  Facility-Administered Medications:     [Transfer Hold] docusate sodium (Colace) capsule 100 mg, 100 mg, oral, BID, BEOT Cabrera    heparin (porcine) injection 5,000 Units, 5,000 Units, subcutaneous, q8h SULEMAN, Carlos Eduardo Adames DO, 5,000 Units at 02/04/25 0532    lactated Ringer's infusion, 100 mL/hr, intravenous, Continuous, BETO Cabrera, Last Rate: 100 mL/hr at 02/04/25 1128, 100 mL/hr at 02/04/25 1128    ondansetron (Zofran) tablet 4 mg, 4 mg, oral, q8h PRN, Carlos Eduardo Adames DO    oxyCODONE (Roxicodone) immediate release tablet 10 mg, 10 mg, oral, q6h PRN, Carlos Eduardo Adames DO, 10 mg at 02/04/25 0425    oxyCODONE (Roxicodone) immediate release tablet 5 mg, 5 mg, oral, q6h PRN, Carlos Eduardo Adames DO, 5 mg at 02/04/25 0851    oxygen (O2) therapy, , inhalation, Continuous - Inhalation, Darwin Briones MD, Last Rate: 60,000 mL/hr at 02/04/25 1021, 1 L/min at 02/04/25 1021    [Transfer Hold] polyethylene glycol (Glycolax, Miralax) packet 17 g, 17 g, oral, Daily, BETO Cabrera    tamsulosin (Flomax) 24 hr capsule 0.4 mg, 0.4 mg, oral, Daily, Carlos Eduardo Adames DO, 0.4 mg at 02/04/25 0851  Prior to Admission medications    Medication Sig Start Date End Date Taking? Authorizing Provider   amlodipine-olmesartan (Helen) 5-20 mg tablet TAKE ONE TABLET BY MOUTH DAILY 8/22/24  Yes Asia Bob MD   cholecalciferol, vitamin D3, (VITAMIN D3 ORAL)    Yes Historical Provider, MD   DULoxetine (Cymbalta) 30 mg DR capsule TAKE 1 CAPSULE (30 MG) BY MOUTH ONCE DAILY. DO NOT CRUSH OR CHEW. 6/11/24  Yes Asia Bob MD   vitamin K2, MK-4, 100 mcg tablet Take by mouth.   Yes Historical Provider, MD   Praluent Pen 150 mg/mL pen injector INJECT 150 MG (ONE PEN) UNDER THE SKIN ONCE EVERY TWO WEEKS. 12/17/23 12/16/24  Lopez Baldwin MD     Allergies   Allergen Reactions    Alendronate Unknown    Statins-Hmg-Coa Reductase Inhibitors Unknown     Social History     Tobacco Use    Smoking status: Never     Smokeless tobacco: Never   Substance Use Topics    Alcohol use: Never         Chemistry    Lab Results   Component Value Date/Time     02/04/2025 0524    K 4.4 02/04/2025 0524     02/04/2025 0524    CO2 22 02/04/2025 0524    BUN 28 (H) 02/04/2025 0524    CREATININE 1.59 (H) 02/04/2025 0524    Lab Results   Component Value Date/Time    CALCIUM 8.5 (L) 02/04/2025 0524    ALKPHOS 83 02/03/2025 2310    AST 20 02/03/2025 2310    ALT 14 02/03/2025 2310    BILITOT 0.5 02/03/2025 2310          Lab Results   Component Value Date/Time    WBC 11.2 02/04/2025 0524    HGB 10.9 (L) 02/04/2025 0524    HCT 33.0 (L) 02/04/2025 0524     02/04/2025 0524     Lab Results   Component Value Date/Time    PROTIME 11.0 02/04/2025 0524    INR 1.0 02/04/2025 0524     Encounter Date: 02/03/25   ECG 12 lead   Result Value    Ventricular Rate 89    Atrial Rate 89    [place] Interval 172    QRS Duration 66    QT Interval 372    QTC Calculation(Bazett) 452    P Axis 72    R Axis 30    T Axis 7    QRS Count 15    Q Onset 228    P Onset 142    P Offset 191    T Offset 414    QTC Fredericia 424    Narrative    Normal sinus rhythm  Possible Left atrial enlargement  Nonspecific ST and T wave abnormality  Abnormal ECG  When compared with ECG of 28-OCT-2022 09:38,  Nonspecific T wave abnormality now evident in Anterior leads     No results found for this or any previous visit from the past 1095 days.      Vitals:    02/04/25 1212   BP: 133/79   Pulse: 80   Resp:    Temp: 36.6 °C (97.9 °F)   SpO2: 92%       Past Surgical History:   Procedure Laterality Date    BELT ABDOMINOPLASTY  10/15/2014    Abdominoplasty    HYSTERECTOMY  10/15/2014    Supracervical Hysterectomy    MOUTH SURGERY  10/15/2014    Oral Surgery Tooth Extraction    TONSILLECTOMY  10/15/2014    Tonsillectomy With Adenoidectomy    TOTAL ABDOMINAL HYSTERECTOMY W/ BILATERAL SALPINGOOPHORECTOMY  10/15/2014    Salpingo-oophorectomy Bilateral     Past Medical History:   Diagnosis Date     Age-related osteoporosis without current pathological fracture 01/04/2023    Osteoporosis    Anxiety     BRCA1 negative     Double vision 04/09/2024    Hydronephrosis of left kidney 02/03/2025    Hydroureter on left 02/03/2025    Hypercholesteremia     Hypertension     Kidney stone on left side 02/03/2025    Nasal congestion 06/01/2021    Sinus congestion    Stenosis of cervical spine     Thyroid nodule     TMJ arthropathy     Unspecified symptoms and signs involving the genitourinary system 11/27/2020    Urinary symptom or sign       Current Facility-Administered Medications:     [Transfer Hold] docusate sodium (Colace) capsule 100 mg, 100 mg, oral, BID, Alison Tellez APRN-CNP    heparin (porcine) injection 5,000 Units, 5,000 Units, subcutaneous, q8h SULEMAN, Carlos Eduardo Adames DO, 5,000 Units at 02/04/25 0532    lactated Ringer's infusion, 100 mL/hr, intravenous, Continuous, JUDI Cabrera-CNP, Last Rate: 100 mL/hr at 02/04/25 1128, 100 mL/hr at 02/04/25 1128    ondansetron (Zofran) tablet 4 mg, 4 mg, oral, q8h PRN, Carlos Eduardo Adames DO    oxyCODONE (Roxicodone) immediate release tablet 10 mg, 10 mg, oral, q6h PRN, Carlos Eduardo Adames DO, 10 mg at 02/04/25 0425    oxyCODONE (Roxicodone) immediate release tablet 5 mg, 5 mg, oral, q6h PRN, Carlos Eduardo Adames DO, 5 mg at 02/04/25 0851    oxygen (O2) therapy, , inhalation, Continuous - Inhalation, Darwin Briones MD, Last Rate: 60,000 mL/hr at 02/04/25 1021, 1 L/min at 02/04/25 1021    [Transfer Hold] polyethylene glycol (Glycolax, Miralax) packet 17 g, 17 g, oral, Daily, Alison Tellez APRN-CNP    tamsulosin (Flomax) 24 hr capsule 0.4 mg, 0.4 mg, oral, Daily, Carlos Eduardo Adames DO, 0.4 mg at 02/04/25 0851  Prior to Admission medications    Medication Sig Start Date End Date Taking? Authorizing Provider   amlodipine-olmesartan (Helen) 5-20 mg tablet TAKE ONE TABLET BY MOUTH DAILY 8/22/24  Yes Asia Bob MD   cholecalciferol, vitamin D3, (VITAMIN D3 ORAL)    Yes  Historical Provider, MD   DULoxetine (Cymbalta) 30 mg DR capsule TAKE 1 CAPSULE (30 MG) BY MOUTH ONCE DAILY. DO NOT CRUSH OR CHEW. 6/11/24  Yes Asia Bob MD   vitamin K2, MK-4, 100 mcg tablet Take by mouth.   Yes Historical Provider, MD   Praluent Pen 150 mg/mL pen injector INJECT 150 MG (ONE PEN) UNDER THE SKIN ONCE EVERY TWO WEEKS. 12/17/23 12/16/24  Lopez Baldwin MD     Allergies   Allergen Reactions    Alendronate Unknown    Statins-Hmg-Coa Reductase Inhibitors Unknown     Social History     Tobacco Use    Smoking status: Never    Smokeless tobacco: Never   Substance Use Topics    Alcohol use: Never         Chemistry    Lab Results   Component Value Date/Time     02/04/2025 0524    K 4.4 02/04/2025 0524     02/04/2025 0524    CO2 22 02/04/2025 0524    BUN 28 (H) 02/04/2025 0524    CREATININE 1.59 (H) 02/04/2025 0524    Lab Results   Component Value Date/Time    CALCIUM 8.5 (L) 02/04/2025 0524    ALKPHOS 83 02/03/2025 2310    AST 20 02/03/2025 2310    ALT 14 02/03/2025 2310    BILITOT 0.5 02/03/2025 2310          Lab Results   Component Value Date/Time    WBC 11.2 02/04/2025 0524    HGB 10.9 (L) 02/04/2025 0524    HCT 33.0 (L) 02/04/2025 0524     02/04/2025 0524     Lab Results   Component Value Date/Time    PROTIME 11.0 02/04/2025 0524    INR 1.0 02/04/2025 0524     Encounter Date: 02/03/25   ECG 12 lead   Result Value    Ventricular Rate 89    Atrial Rate 89    SC Interval 172    QRS Duration 66    QT Interval 372    QTC Calculation(Bazett) 452    P Axis 72    R Axis 30    T Axis 7    QRS Count 15    Q Onset 228    P Onset 142    P Offset 191    T Offset 414    QTC Fredericia 424    Narrative    Normal sinus rhythm  Possible Left atrial enlargement  Nonspecific ST and T wave abnormality  Abnormal ECG  When compared with ECG of 28-OCT-2022 09:38,  Nonspecific T wave abnormality now evident in Anterior leads     No results found for this or any previous visit from the past 1095  days.      Vitals:    02/04/25 1212   BP: 133/79   Pulse: 80   Resp:    Temp: 36.6 °C (97.9 °F)   SpO2: 92%       Past Surgical History:   Procedure Laterality Date    BELT ABDOMINOPLASTY  10/15/2014    Abdominoplasty    HYSTERECTOMY  10/15/2014    Supracervical Hysterectomy    MOUTH SURGERY  10/15/2014    Oral Surgery Tooth Extraction    TONSILLECTOMY  10/15/2014    Tonsillectomy With Adenoidectomy    TOTAL ABDOMINAL HYSTERECTOMY W/ BILATERAL SALPINGOOPHORECTOMY  10/15/2014    Salpingo-oophorectomy Bilateral     Past Medical History:   Diagnosis Date    Age-related osteoporosis without current pathological fracture 01/04/2023    Osteoporosis    Anxiety     BRCA1 negative     Double vision 04/09/2024    Hydronephrosis of left kidney 02/03/2025    Hydroureter on left 02/03/2025    Hypercholesteremia     Hypertension     Kidney stone on left side 02/03/2025    Nasal congestion 06/01/2021    Sinus congestion    Stenosis of cervical spine     Thyroid nodule     TMJ arthropathy     Unspecified symptoms and signs involving the genitourinary system 11/27/2020    Urinary symptom or sign       Current Facility-Administered Medications:     [Transfer Hold] docusate sodium (Colace) capsule 100 mg, 100 mg, oral, BID, JUDI Cabrera-CNP    heparin (porcine) injection 5,000 Units, 5,000 Units, subcutaneous, q8h SULEMAN, Carlos Eduardo Adames DO, 5,000 Units at 02/04/25 0532    lactated Ringer's infusion, 100 mL/hr, intravenous, Continuous, BETO Cabrera, Last Rate: 100 mL/hr at 02/04/25 1128, 100 mL/hr at 02/04/25 1128    ondansetron (Zofran) tablet 4 mg, 4 mg, oral, q8h PRN, Carlos Eduardo Adames DO    oxyCODONE (Roxicodone) immediate release tablet 10 mg, 10 mg, oral, q6h PRN, Carlos Eduardo Adames DO, 10 mg at 02/04/25 0425    oxyCODONE (Roxicodone) immediate release tablet 5 mg, 5 mg, oral, q6h PRN, Carlos Eduardo Adames DO, 5 mg at 02/04/25 0851    oxygen (O2) therapy, , inhalation, Continuous - Inhalation, Darwin Briones MD, Last Rate:  60,000 mL/hr at 02/04/25 1021, 1 L/min at 02/04/25 1021    [Transfer Hold] polyethylene glycol (Glycolax, Miralax) packet 17 g, 17 g, oral, Daily, Alison Tellez APRN-CNP    tamsulosin (Flomax) 24 hr capsule 0.4 mg, 0.4 mg, oral, Daily, Carlos Eduardo Adames DO, 0.4 mg at 02/04/25 0851  Prior to Admission medications    Medication Sig Start Date End Date Taking? Authorizing Provider   amlodipine-olmesartan (Helen) 5-20 mg tablet TAKE ONE TABLET BY MOUTH DAILY 8/22/24  Yes Asia Bob MD   cholecalciferol, vitamin D3, (VITAMIN D3 ORAL)    Yes Historical Provider, MD   DULoxetine (Cymbalta) 30 mg DR capsule TAKE 1 CAPSULE (30 MG) BY MOUTH ONCE DAILY. DO NOT CRUSH OR CHEW. 6/11/24  Yes Asia Bob MD   vitamin K2, MK-4, 100 mcg tablet Take by mouth.   Yes Historical Provider, MD   Praluent Pen 150 mg/mL pen injector INJECT 150 MG (ONE PEN) UNDER THE SKIN ONCE EVERY TWO WEEKS. 12/17/23 12/16/24  Lopez Baldwin MD     Allergies   Allergen Reactions    Alendronate Unknown    Statins-Hmg-Coa Reductase Inhibitors Unknown     Social History     Tobacco Use    Smoking status: Never    Smokeless tobacco: Never   Substance Use Topics    Alcohol use: Never         Chemistry    Lab Results   Component Value Date/Time     02/04/2025 0524    K 4.4 02/04/2025 0524     02/04/2025 0524    CO2 22 02/04/2025 0524    BUN 28 (H) 02/04/2025 0524    CREATININE 1.59 (H) 02/04/2025 0524    Lab Results   Component Value Date/Time    CALCIUM 8.5 (L) 02/04/2025 0524    ALKPHOS 83 02/03/2025 2310    AST 20 02/03/2025 2310    ALT 14 02/03/2025 2310    BILITOT 0.5 02/03/2025 2310          Lab Results   Component Value Date/Time    WBC 11.2 02/04/2025 0524    HGB 10.9 (L) 02/04/2025 0524    HCT 33.0 (L) 02/04/2025 0524     02/04/2025 0524     Lab Results   Component Value Date/Time    PROTIME 11.0 02/04/2025 0524    INR 1.0 02/04/2025 0524     Encounter Date: 02/03/25   ECG 12 lead   Result Value    Ventricular Rate  89    Atrial Rate 89    FL Interval 172    QRS Duration 66    QT Interval 372    QTC Calculation(Bazett) 452    P Axis 72    R Axis 30    T Axis 7    QRS Count 15    Q Onset 228    P Onset 142    P Offset 191    T Offset 414    QTC Fredericia 424    Narrative    Normal sinus rhythm  Possible Left atrial enlargement  Nonspecific ST and T wave abnormality  Abnormal ECG  When compared with ECG of 28-OCT-2022 09:38,  Nonspecific T wave abnormality now evident in Anterior leads     No results found for this or any previous visit from the past 1095 days.      Relevant Problems   Cardiac   (+) Essential hypertension   (+) Hypercholesteremia      Neuro   (+) Anxiety      Musculoskeletal   (+) Scoliosis   (+) Stenosis, cervical spine       Clinical information reviewed:   Tobacco  Allergies  Meds   Med Hx  Surg Hx   Fam Hx  Soc Hx        NPO Detail:  NPO/Void Status  Carbohydrate Drink Given Prior to Surgery? : N  Date of Last Liquid: 02/04/25  Time of Last Liquid: 0800  Last Intake Type: Clear fluids  Time of Last Void: 1200         Physical Exam    Airway  Mallampati: II     Cardiovascular - normal exam     Dental    Pulmonary    Abdominal            Anesthesia Plan    History of general anesthesia?: yes  History of complications of general anesthesia?: no    ASA 3     general     The patient is not a current smoker.  Patient was not previously instructed to abstain from smoking on day of procedure.  Patient did not smoke on day of procedure.  Education provided regarding risk of obstructive sleep apnea.  intravenous induction   Anesthetic plan and risks discussed with patient.    Plan discussed with CRNA.

## 2025-02-04 NOTE — H&P
History Of Present Illness  Stacey Davis is a 69 y.o. female  with PMH of HTN, HLD, diplopia, osteoporosis, ZARINA presenting with left flank pain. Patient stated that she started having lower back pain and front abdominal pain yesterday morning. She stated she didn't take anything to make her feel better. She admitted to N,V,chills, flank pain, SOB, dizziness. Denied c/p, diarrhea, fever. Patient denied previous history of kidney stones or kidney disease.   No smoking history or alcohol or drug use.     ED course:   CMP: Glu 150, Cr 1.77, bicarb 20  CBC: wbc 16.6, Hg 11.9  UA 0.1 blood  Lactate 2.2  CT abdomen: Left hydronephrosis and hydroureter with a 4 mm calculus in the  distal left ureter at the ureteral vesicular junction.    Past Medical History  She has a past medical history of Age-related osteoporosis without current pathological fracture (01/04/2023), BRCA1 negative, Nasal congestion (06/01/2021), and Unspecified symptoms and signs involving the genitourinary system (11/27/2020).    Surgical History  She has a past surgical history that includes Total abdominal hysterectomy w/ bilateral salpingoophorectomy (10/15/2014); Hysterectomy (10/15/2014); Tonsillectomy (10/15/2014); Mouth surgery (10/15/2014); and Belt abdominoplasty (10/15/2014).     Social History  She reports that she has never smoked. She has never used smokeless tobacco. She reports that she does not drink alcohol and does not use drugs.    Family History  Family History   Problem Relation Name Age of Onset    Breast cancer Mother      Arthritis Father      Heart attack Father  46    Breast cancer Sister  45    Breast cancer Sister  45    No Known Problems Sister      Breast cancer Maternal Grandmother  40    Glaucoma Maternal Grandfather          Allergies  Alendronate and Statins-hmg-coa reductase inhibitors         Physical Exam  Constitutional:       General: She is not in acute distress.     Appearance: Normal appearance. She is not  ill-appearing.   HENT:      Head: Normocephalic and atraumatic.      Nose: Nose normal.      Mouth/Throat:      Mouth: Mucous membranes are moist.   Eyes:      Conjunctiva/sclera: Conjunctivae normal.   Cardiovascular:      Rate and Rhythm: Normal rate and regular rhythm.      Pulses: Normal pulses.      Heart sounds: Normal heart sounds.   Pulmonary:      Effort: Pulmonary effort is normal. No respiratory distress.      Breath sounds: Normal breath sounds. No wheezing.   Abdominal:      General: Abdomen is flat. Bowel sounds are normal. There is no distension.      Palpations: Abdomen is soft.      Tenderness: There is abdominal tenderness in the left upper quadrant and left lower quadrant. There is guarding.      Comments: LEFT flank pain   Musculoskeletal:         General: No swelling. Normal range of motion.      Right lower leg: No edema.      Left lower leg: No edema.   Skin:     General: Skin is warm.   Neurological:      General: No focal deficit present.      Mental Status: She is alert and oriented to person, place, and time.   Psychiatric:         Mood and Affect: Mood normal.         Behavior: Behavior normal.          Last Recorded Vitals  /78   Pulse 88   Temp 36.4 °C (97.5 °F) (Temporal)   Resp 13   Wt 52.2 kg (115 lb)   SpO2 97%     Relevant Results    Results for orders placed or performed during the hospital encounter of 02/03/25 (from the past 24 hours)   CBC with Differential   Result Value Ref Range    WBC 16.6 (H) 4.4 - 11.3 x10*3/uL    nRBC 0.0 0.0 - 0.0 /100 WBCs    RBC 4.08 4.00 - 5.20 x10*6/uL    Hemoglobin 11.9 (L) 12.0 - 16.0 g/dL    Hematocrit 35.2 (L) 36.0 - 46.0 %    MCV 86 80 - 100 fL    MCH 29.2 26.0 - 34.0 pg    MCHC 33.8 32.0 - 36.0 g/dL    RDW 13.2 11.5 - 14.5 %    Platelets 295 150 - 450 x10*3/uL    Neutrophils % 85.0 40.0 - 80.0 %    Immature Granulocytes %, Automated 0.4 0.0 - 0.9 %    Lymphocytes % 8.4 13.0 - 44.0 %    Monocytes % 5.6 2.0 - 10.0 %    Eosinophils %  0.2 0.0 - 6.0 %    Basophils % 0.4 0.0 - 2.0 %    Neutrophils Absolute 14.14 (H) 1.20 - 7.70 x10*3/uL    Immature Granulocytes Absolute, Automated 0.07 0.00 - 0.70 x10*3/uL    Lymphocytes Absolute 1.40 1.20 - 4.80 x10*3/uL    Monocytes Absolute 0.93 0.10 - 1.00 x10*3/uL    Eosinophils Absolute 0.03 0.00 - 0.70 x10*3/uL    Basophils Absolute 0.06 0.00 - 0.10 x10*3/uL   Comprehensive Metabolic Panel   Result Value Ref Range    Glucose 150 (H) 74 - 99 mg/dL    Sodium 136 136 - 145 mmol/L    Potassium 3.8 3.5 - 5.3 mmol/L    Chloride 100 98 - 107 mmol/L    Bicarbonate 20 (L) 21 - 32 mmol/L    Anion Gap 20 10 - 20 mmol/L    Urea Nitrogen 35 (H) 6 - 23 mg/dL    Creatinine 1.77 (H) 0.50 - 1.05 mg/dL    eGFR 31 (L) >60 mL/min/1.73m*2    Calcium 10.0 8.6 - 10.3 mg/dL    Albumin 4.3 3.4 - 5.0 g/dL    Alkaline Phosphatase 83 33 - 136 U/L    Total Protein 7.9 6.4 - 8.2 g/dL    AST 20 9 - 39 U/L    Bilirubin, Total 0.5 0.0 - 1.2 mg/dL    ALT 14 7 - 45 U/L   Lipase   Result Value Ref Range    Lipase 29 9 - 82 U/L   Lactate   Result Value Ref Range    Lactate 2.2 (H) 0.4 - 2.0 mmol/L   Urinalysis with Reflex Culture and Microscopic   Result Value Ref Range    Color, Urine Colorless (N) Light-Yellow, Yellow, Dark-Yellow    Appearance, Urine Clear Clear    Specific Gravity, Urine 1.010 1.005 - 1.035    pH, Urine 7.0 5.0, 5.5, 6.0, 6.5, 7.0, 7.5, 8.0    Protein, Urine NEGATIVE NEGATIVE, 10 (TRACE), 20 (TRACE) mg/dL    Glucose, Urine 50 (TRACE) (A) Normal mg/dL    Blood, Urine 0.1 (1+) (A) NEGATIVE    Ketones, Urine 10 (1+) (A) NEGATIVE mg/dL    Bilirubin, Urine NEGATIVE NEGATIVE    Urobilinogen, Urine Normal Normal mg/dL    Nitrite, Urine NEGATIVE NEGATIVE    Leukocyte Esterase, Urine NEGATIVE NEGATIVE   Urinalysis Microscopic   Result Value Ref Range    WBC, Urine NONE 1-5, NONE /HPF    RBC, Urine >20 (A) NONE, 1-2, 3-5 /HPF        CT abdomen pelvis wo IV contrast    Result Date: 2/4/2025  Interpreted By:  Pavan Christine, STUDY: CT  ABDOMEN PELVIS WO IV CONTRAST;  2/4/2025 12:41 am   INDICATION: Signs/Symptoms:LLQ abd pain.   COMPARISON: None.   ACCESSION NUMBER(S): [id number]   ORDERING CLINICIAN: JESUS KENNEDY   TECHNIQUE: Contiguous axial images of the abdomen and pelvis were obtained without intravenous contrast. Coronal and sagittal reformatted images were obtained from the axial images.   FINDINGS: There is limited evaluation of the lung bases. Bibasilar subsegmental atelectasis. Small hiatal hernia.   Evaluation of the abdominal viscera is limited secondary to lack of intravenous contrast. Limited evaluation for liver mass on noncontrast examination. The gallbladder is present. No dilatation common bile duct.   The pancreas, spleen, and adrenal glands appear unremarkable.   3.1 cm cyst in the right kidney. There is left hydronephrosis and hydroureter with a 4 mm calculus in the distal left ureter at the ureteral vesicular junction.   No evidence of bowel obstruction or acute appendicitis.   Urinary bladder appears unremarkable.   There is scoliotic curvature and degenerative change of the lumbar spine.       Left hydronephrosis and hydroureter with a 4 mm calculus in the distal left ureter at the ureteral vesicular junction.   MACRO: None   Signed by: Pavan Christine 2/4/2025 1:58 AM Dictation workstation:   [id number]         Assessment/Plan   Assessment & Plan  Generalized abdominal pain      70yo w Pmhx of HTN, HLD, ZARINA, chronic pain here for L nephrolithiasis    4mm L kidney stone with hydronephrosis, hydroureter   LYNDA, likely prerenal  UA without pyuria, hematuria 2/2 above   Incidental R kidney cyst    -IVF, pain control, flomax, zofran PRN, IS   -urology consult     Hx of HTN, ZARINA   -holding antihypertensives, continue Cymbalta      DVTppx - heparin     Nataliya Rodriguez M.D.   Internal Medicine, PGY 1

## 2025-02-04 NOTE — CARE PLAN
The patient's goals for the shift include  be free from nausea throughout shift    The clinical goals for the shift include pain management

## 2025-02-04 NOTE — ED TRIAGE NOTES
Pt BIB POV from home w/ c/o 2 hours of severe abd px, n/v. Some abd px today but went away. C/o constipation. Last bm 4-5 days ago. Denies narcotic use currently. Denies passing gas either. Has been eating normally. Denies URI sx.    Cristiano Sanchez(Attending)

## 2025-02-05 VITALS
SYSTOLIC BLOOD PRESSURE: 137 MMHG | HEIGHT: 62 IN | HEART RATE: 77 BPM | OXYGEN SATURATION: 93 % | TEMPERATURE: 97.9 F | DIASTOLIC BLOOD PRESSURE: 76 MMHG | BODY MASS INDEX: 23.59 KG/M2 | RESPIRATION RATE: 16 BRPM | WEIGHT: 128.2 LBS

## 2025-02-05 PROBLEM — R10.84 GENERALIZED ABDOMINAL PAIN: Status: RESOLVED | Noted: 2025-02-04 | Resolved: 2025-02-05

## 2025-02-05 LAB
ANION GAP SERPL CALC-SCNC: 11 MMOL/L (ref 10–20)
BUN SERPL-MCNC: 15 MG/DL (ref 6–23)
CALCIUM SERPL-MCNC: 8.8 MG/DL (ref 8.6–10.3)
CHLORIDE SERPL-SCNC: 106 MMOL/L (ref 98–107)
CO2 SERPL-SCNC: 26 MMOL/L (ref 21–32)
CREAT SERPL-MCNC: 0.87 MG/DL (ref 0.5–1.05)
EGFRCR SERPLBLD CKD-EPI 2021: 73 ML/MIN/1.73M*2
ERYTHROCYTE [DISTWIDTH] IN BLOOD BY AUTOMATED COUNT: 13.2 % (ref 11.5–14.5)
GLUCOSE SERPL-MCNC: 119 MG/DL (ref 74–99)
HCT VFR BLD AUTO: 30 % (ref 36–46)
HGB BLD-MCNC: 9.9 G/DL (ref 12–16)
MCH RBC QN AUTO: 28.6 PG (ref 26–34)
MCHC RBC AUTO-ENTMCNC: 33 G/DL (ref 32–36)
MCV RBC AUTO: 87 FL (ref 80–100)
NRBC BLD-RTO: 0 /100 WBCS (ref 0–0)
PLATELET # BLD AUTO: 212 X10*3/UL (ref 150–450)
POTASSIUM SERPL-SCNC: 4 MMOL/L (ref 3.5–5.3)
RBC # BLD AUTO: 3.46 X10*6/UL (ref 4–5.2)
SODIUM SERPL-SCNC: 139 MMOL/L (ref 136–145)
WBC # BLD AUTO: 6.1 X10*3/UL (ref 4.4–11.3)

## 2025-02-05 PROCEDURE — 2500000004 HC RX 250 GENERAL PHARMACY W/ HCPCS (ALT 636 FOR OP/ED): Performed by: NURSE PRACTITIONER

## 2025-02-05 PROCEDURE — 82374 ASSAY BLOOD CARBON DIOXIDE: CPT | Performed by: NURSE PRACTITIONER

## 2025-02-05 PROCEDURE — 99232 SBSQ HOSP IP/OBS MODERATE 35: CPT | Performed by: NURSE PRACTITIONER

## 2025-02-05 PROCEDURE — 85027 COMPLETE CBC AUTOMATED: CPT | Performed by: NURSE PRACTITIONER

## 2025-02-05 PROCEDURE — 80048 BASIC METABOLIC PNL TOTAL CA: CPT | Performed by: NURSE PRACTITIONER

## 2025-02-05 PROCEDURE — 2500000001 HC RX 250 WO HCPCS SELF ADMINISTERED DRUGS (ALT 637 FOR MEDICARE OP): Performed by: INTERNAL MEDICINE

## 2025-02-05 PROCEDURE — 2500000002 HC RX 250 W HCPCS SELF ADMINISTERED DRUGS (ALT 637 FOR MEDICARE OP, ALT 636 FOR OP/ED)

## 2025-02-05 PROCEDURE — 36415 COLL VENOUS BLD VENIPUNCTURE: CPT | Performed by: NURSE PRACTITIONER

## 2025-02-05 PROCEDURE — 99239 HOSP IP/OBS DSCHRG MGMT >30: CPT | Performed by: NURSE PRACTITIONER

## 2025-02-05 PROCEDURE — 2500000004 HC RX 250 GENERAL PHARMACY W/ HCPCS (ALT 636 FOR OP/ED): Performed by: INTERNAL MEDICINE

## 2025-02-05 PROCEDURE — 2500000004 HC RX 250 GENERAL PHARMACY W/ HCPCS (ALT 636 FOR OP/ED)

## 2025-02-05 RX ORDER — DOCUSATE SODIUM 100 MG/1
100 CAPSULE, LIQUID FILLED ORAL 2 TIMES DAILY
Start: 2025-02-05

## 2025-02-05 RX ORDER — POLYETHYLENE GLYCOL 3350 17 G/17G
17 POWDER, FOR SOLUTION ORAL DAILY
Start: 2025-02-06

## 2025-02-05 RX ADMIN — SODIUM CHLORIDE, POTASSIUM CHLORIDE, SODIUM LACTATE AND CALCIUM CHLORIDE 100 ML/HR: 600; 310; 30; 20 INJECTION, SOLUTION INTRAVENOUS at 03:20

## 2025-02-05 RX ADMIN — TAMSULOSIN HYDROCHLORIDE 0.4 MG: 0.4 CAPSULE ORAL at 09:01

## 2025-02-05 RX ADMIN — DOCUSATE SODIUM 100 MG: 100 CAPSULE, LIQUID FILLED ORAL at 09:00

## 2025-02-05 RX ADMIN — POLYETHYLENE GLYCOL 3350 17 G: 17 POWDER, FOR SOLUTION ORAL at 09:00

## 2025-02-05 RX ADMIN — HEPARIN SODIUM 5000 UNITS: 5000 INJECTION INTRAVENOUS; SUBCUTANEOUS at 06:37

## 2025-02-05 ASSESSMENT — PAIN - FUNCTIONAL ASSESSMENT: PAIN_FUNCTIONAL_ASSESSMENT: 0-10

## 2025-02-05 ASSESSMENT — ACTIVITIES OF DAILY LIVING (ADL): LACK_OF_TRANSPORTATION: NO

## 2025-02-05 ASSESSMENT — PAIN SCALES - GENERAL: PAINLEVEL_OUTOF10: 0 - NO PAIN

## 2025-02-05 NOTE — DISCHARGE SUMMARY
Discharge Diagnosis  Generalized abdominal pain    Issues Requiring Follow-Up  F/U with Urology/Stent removal  F/U with PCP    Discharge Meds     Medication List      START taking these medications     docusate sodium 100 mg capsule; Commonly known as: Colace; Take 1   capsule (100 mg) by mouth 2 times a day.   polyethylene glycol 17 gram packet; Commonly known as: Glycolax,   Miralax; Take 17 g by mouth once daily.; Start taking on: February 6, 2025     CONTINUE taking these medications     amlodipine-olmesartan 5-20 mg tablet; Commonly known as: Helen; TAKE ONE   TABLET BY MOUTH DAILY   DULoxetine 30 mg DR capsule; Commonly known as: Cymbalta; TAKE 1 CAPSULE   (30 MG) BY MOUTH ONCE DAILY. DO NOT CRUSH OR CHEW.   VITAMIN D3 ORAL   vitamin K2 (MK-4) 100 mcg tablet     STOP taking these medications     Praluent Pen 150 mg/mL pen injector; Generic drug: alirocumab       Test Results Pending At Discharge  Pending Labs       Order Current Status    Calculi (Stone) Analysis In process          Images:  CT A/P:   Impression: Left hydronephrosis and hydroureter with a 4 mm calculus in the  distal left ureter at the ureteral vesicular junction.     Hospital Course  Stacey Davis is a 69 y.o. female with no significant medical history who presented to ED with c/o left-sided flank pain. CT A/P demonstrated left hydronephrosis and dydroureter with 4 mm calculus in left UVJ. BMP concerning for LYNDA. Admitted for management. UA 1+ blood; negative leukocytes esterace. Given IVF, Tamsulosin, and analgesics for pain. Urology consulted - underwent ureteroscopy with stone basket retrieval and placement of JJ stent. Creatinine normalized - 0.87 today. Pain much improved. On laxatives and stool softeners for constipation (chronic) and advised on homegoing regimen. Electrolytes monitored and replaced as needed. On Heparin for DVT prophylaxis. Acute anemia noted on CBC with Hgb 9.9. Likely related to hematuria/renal calculi and  hemodilution. Repeat CBC in 1-2 weeks with PCP follow up to re-evaluate. Hemodynamically stable for discharge to home with instructions to follow up with urology in 10-14 days.     Disposition  Plan of care discussed with attending, Dr. Neves and Urology APRN. Patient was seen and examined in her room this AM. She is feeling much better and expressing readiness for discharge. Still no BM and does have some abdominal pressure with urination. She denies chest pain, breathing difficulties, abdominal pain, N/V/D/C, fever, or chills.  Medications reviewed and reconciled. Scripts prepared as needed. Greater than 35 minutes spent in coordination of care, including physical examination, chart review, medication reconciliation, collaboration with providers, staff, and family.     Pertinent Physical Exam At Time of Discharge  Physical Exam  Constitutional: A&O x 3; NAD;  calm and cooperative  Eyes: EOM's intact  HEENT: Normocephalic, Atraumatic. Oral mucosa moist.   Neck: Supple. No JVD, lymphadenopathy.   Lungs: CTAB with fair air movement. Respirations even and unlabored on room air.   Heart: RRR  Abdomen: Soft, left-sided tenderness, non-distended, +BS  MS/Extremities: BAUMAN equally x 4. No edema. Peripheral pulses intact bilaterally.   Neuro: A&O x3; no focal deficits; gross motor and sensation intact.   Skin: Warm and dry. No rashes or lesions  Psych: Normal affect.      Outpatient Follow-Up  Future Appointments   Date Time Provider Department [place]   2/17/2025  2:00 PM Yaneth Hurley MD [id number] [facility]   2/21/2025  3:30 PM Willi Chappell OD [id number] [facility]   4/21/2025  9:45 AM Gunner Fernandez MD PhD [id number] [facility]         Alison Tellez, APRN-CNP

## 2025-02-05 NOTE — PROGRESS NOTES
02/05/25 0856   Discharge Planning   Living Arrangements Spouse/significant other   Support Systems Spouse/significant other   Assistance Needed A&Ox4, independent with ADLs, no DME, room air at baseline and currently room air, no BIPAP/CPAP, drives. PCP Dr. Asia Bob   Type of Residence Private residence   Number of Stairs to Enter Residence 0   Number of Stairs Within Residence 0   Do you have animals or pets at home? Yes   Type of Animals or Pets 2 cats   Home or Post Acute Services None   Expected Discharge Disposition Home   Financial Resource Strain   How hard is it for you to pay for the very basics like food, housing, medical care, and heating? Not hard   Housing Stability   In the last 12 months, was there a time when you were not able to pay the mortgage or rent on time? N   At any time in the past 12 months, were you homeless or living in a shelter (including now)? N   Transportation Needs   In the past 12 months, has lack of transportation kept you from medical appointments or from getting medications? no   In the past 12 months, has lack of transportation kept you from meetings, work, or from getting things needed for daily living? No        02/05/25 1000   Discharge Planning   Expected Discharge Disposition Home  (Patient aware and fine with dc today-IMM obtained. Patient denies home going needs. Family to transport home this afternoon.)

## 2025-02-05 NOTE — CARE PLAN
The patient's goals for the shift include  free from pain    The clinical goals for the shift include patient's urine will clear in color

## 2025-02-05 NOTE — PROGRESS NOTES
Stacey Davis is a 69 y.o. female on day 1 of admission presenting with Generalized abdominal pain.    Subjective   Pt is post op day 1 from ureteroscopy with stone removal and cystoscopy with retrograde pyelogram with Dr. Hurley on 2/4/25.     No acute overnight events.   Pr reports pain is improved overall.   Still with mild left flank pain with urinating but improved from yesterday.   Tolerated PO and no n/v.   Has been up ambulating, urinated a couple times without diff.   Passed gas since surgery.   No fever or chills. No CP, SOB.     Objective     Physical Exam  Vitals reviewed.   Constitutional:       General: She is not in acute distress.     Appearance: Normal appearance. She is normal weight. She is not ill-appearing, toxic-appearing or diaphoretic.   HENT:      Head: Normocephalic and atraumatic.      Mouth/Throat:      Mouth: Mucous membranes are moist.   Eyes:      General: No scleral icterus.        Right eye: No discharge.         Left eye: No discharge.      Conjunctiva/sclera: Conjunctivae normal.   Cardiovascular:      Rate and Rhythm: Normal rate and regular rhythm.      Pulses: Normal pulses.      Heart sounds: Normal heart sounds. No murmur heard.     No friction rub. No gallop.   Pulmonary:      Effort: Pulmonary effort is normal. No respiratory distress.      Breath sounds: Normal breath sounds. No stridor. No wheezing, rhonchi or rales.   Chest:      Chest wall: No tenderness.   Abdominal:      General: Bowel sounds are normal. There is no distension.      Palpations: Abdomen is soft. There is no mass.      Tenderness: There is no abdominal tenderness. There is left CVA tenderness (slight). There is no guarding or rebound.      Hernia: No hernia is present.   Musculoskeletal:      Right lower leg: No edema.      Left lower leg: No edema.   Skin:     General: Skin is warm and dry.      Capillary Refill: Capillary refill takes less than 2 seconds.   Neurological:      Mental Status: She  "is alert and oriented to person, place, and time.      Motor: No weakness.      Gait: Abnormal gait: not observed.   Psychiatric:         Mood and Affect: Mood normal.         Behavior: Behavior normal.         Thought Content: Thought content normal.         Judgment: Judgment normal.         Last Recorded Vitals  Blood pressure 137/76, pulse 77, temperature 36.6 °C (97.9 °F), temperature source Temporal, resp. rate 16, height 1.575 m (5' 2\"), weight 58.2 kg (128 lb 3.2 oz), SpO2 93%.  At time of exam HR 77 and pulse ox 94 % on RA.    Intake/Output last 3 Shifts:  I/O last 3 completed shifts:  In: 5902 (101.5 mL/kg) [P.O.:700; I.V.:1202 (20.7 mL/kg); IV Piggyback:4000]  Out: - (0 mL/kg)   Weight: 58.2 kg     Relevant Results    FL pyelogram retrograde    Result Date: 2/4/2025  These images are not reportable by radiology and will not be interpreted by  Radiologists.    ECG 12 lead    Result Date: 2/4/2025  Normal sinus rhythm Possible Left atrial enlargement Nonspecific ST and T wave abnormality Abnormal ECG When compared with ECG of 28-OCT-2022 09:38, Nonspecific T wave abnormality now evident in Anterior leads    CT abdomen pelvis wo IV contrast    Result Date: 2/4/2025  Interpreted By:  Pavan Christine, STUDY: CT ABDOMEN PELVIS WO IV CONTRAST;  2/4/2025 12:41 am   INDICATION: Signs/Symptoms:LLQ abd pain.   COMPARISON: None.   ACCESSION NUMBER(S): [id number]   ORDERING CLINICIAN: JESUS KENNEDY   TECHNIQUE: Contiguous axial images of the abdomen and pelvis were obtained without intravenous contrast. Coronal and sagittal reformatted images were obtained from the axial images.   FINDINGS: There is limited evaluation of the lung bases. Bibasilar subsegmental atelectasis. Small hiatal hernia.   Evaluation of the abdominal viscera is limited secondary to lack of intravenous contrast. Limited evaluation for liver mass on noncontrast examination. The gallbladder is present. No dilatation common bile duct.   The " pancreas, spleen, and adrenal glands appear unremarkable.   3.1 cm cyst in the right kidney. There is left hydronephrosis and hydroureter with a 4 mm calculus in the distal left ureter at the ureteral vesicular junction.   No evidence of bowel obstruction or acute appendicitis.   Urinary bladder appears unremarkable.   There is scoliotic curvature and degenerative change of the lumbar spine.       Left hydronephrosis and hydroureter with a 4 mm calculus in the distal left ureter at the ureteral vesicular junction.   MACRO: None   Signed by: Pavan Christine 2/4/2025 1:58 AM Dictation workstation:   [id number]     Scheduled medications  docusate sodium, 100 mg, oral, BID  heparin (porcine), 5,000 Units, subcutaneous, q8h SULEMAN  oxygen, , inhalation, Continuous - Inhalation  polyethylene glycol, 17 g, oral, Daily  tamsulosin, 0.4 mg, oral, Daily      Continuous medications  lactated Ringer's, 100 mL/hr, Last Rate: 100 mL/hr (02/05/25 0320)      PRN medications  PRN medications: ondansetron, oxyCODONE, oxyCODONE  Results for orders placed or performed during the hospital encounter of 02/03/25 (from the past 24 hours)   CBC   Result Value Ref Range    WBC 6.1 4.4 - 11.3 x10*3/uL    nRBC 0.0 0.0 - 0.0 /100 WBCs    RBC 3.46 (L) 4.00 - 5.20 x10*6/uL    Hemoglobin 9.9 (L) 12.0 - 16.0 g/dL    Hematocrit 30.0 (L) 36.0 - 46.0 %    MCV 87 80 - 100 fL    MCH 28.6 26.0 - 34.0 pg    MCHC 33.0 32.0 - 36.0 g/dL    RDW 13.2 11.5 - 14.5 %    Platelets 212 150 - 450 x10*3/uL   Basic Metabolic Panel   Result Value Ref Range    Glucose 119 (H) 74 - 99 mg/dL    Sodium 139 136 - 145 mmol/L    Potassium 4.0 3.5 - 5.3 mmol/L    Chloride 106 98 - 107 mmol/L    Bicarbonate 26 21 - 32 mmol/L    Anion Gap 11 10 - 20 mmol/L    Urea Nitrogen 15 6 - 23 mg/dL    Creatinine 0.87 0.50 - 1.05 mg/dL    eGFR 73 >60 mL/min/1.73m*2    Calcium 8.8 8.6 - 10.3 mg/dL                            Assessment/Plan   Assessment & Plan  Generalized abdominal pain    Pt  is post op day 1 from ureteroscopy with stone removal and cystoscopy with retrograde pyelogram with Dr. Hurley on 2/4/25.  - imaging reviewed. CT abd pelvis 2/3/25 Left hydronephrosis and hydroureter with a 4 mm calculus in the distal left ureter at the ureteral vesicular junction.  - labs reviewed. LYNDA improved. CBC acceptable.   - regular diet, enc good hydration.  - pain medicine per primary.  - miralax daily - pt can cont upon discharge.  - colace 100 mg BID - pt can cont upon discharge.  - follow up with urology in 10-14 days.     Remainder of care per primary.         I spent 35 minutes in the professional and overall care of this patient.    Dr. Hurley updated on plan of care and in agreement with plan of care.     JUDI Akhtar-CNP

## 2025-02-06 NOTE — DOCUMENTATION CLARIFICATION NOTE
PATIENT:               ZEN KABA  ACCT #:                  [id number]  MRN:                       [id number]  :                       1955  ADMIT DATE:       2/3/2025 11:09 PM  DISCH DATE:        2025 11:40 AM  RESPONDING PROVIDER #:        [id number]          PROVIDER RESPONSE TEXT:    Lactic acidosis is clinically significant and required treatment/monitoring    CDI QUERY TEXT:    Clarification    Instruction:    Based on your assessment of the patient and the clinical information, please provide the requested documentation by clicking on the appropriate radio button and enter any additional information if prompted.    Question: Is there a diagnosis indicative of the lab values or image study    When answering this query, please exercise your independent professional judgment. The fact that a question is being asked, does not imply that any particular answer is desired or expected.    The patient's clinical indicators include:  Clinical Information: 70 y/o female presented with left flank pain/abdominal pain. Admitted with left hydronephrosis and hydroureter with ureteral calculus with LYNDA.    Clinical Indicators: Lactate 2/3: 2.2 repeat  1.8    Treatment: 2/3- IV NS bolus 3 L    Risk Factors: ureteral calculus, LYNDA  Options provided:  -- Lactic acidosis is clinically significant and required treatment/monitoring  -- Lactate not requiring treatment or evaluation  -- Other - I will add my own diagnosis  -- Refer to Clinical Documentation Reviewer    Query created by: Deedee Perez on 2025 7:08 AM      Electronically signed by:  DICK PÉREZ 2025 10:17 AM

## 2025-02-07 DIAGNOSIS — R30.0 DYSURIA: ICD-10-CM

## 2025-02-07 RX ORDER — PHENAZOPYRIDINE HYDROCHLORIDE 200 MG/1
200 TABLET, FILM COATED ORAL 3 TIMES DAILY
Qty: 30 TABLET | Refills: 0 | Status: SHIPPED | OUTPATIENT
Start: 2025-02-07 | End: 2025-02-13 | Stop reason: ALTCHOICE

## 2025-02-07 NOTE — SIGNIFICANT EVENT
Follow Up Phone Call    Outgoing phone call    Spoke to: Stacey Davis Relationship:self   Phone number: [phone]      Outcome: contacted patient/ family   Chief Complaint   Patient presents with    Abdominal Pain          Diagnosis:Not applicable    States she is feeling better. No further questions or concerns.

## 2025-02-08 LAB
APPEARANCE STONE: NORMAL
COMPN STONE: NORMAL
SPECIMEN WT: 26 MG

## 2025-02-11 LAB
ATRIAL RATE: 89 BPM
P AXIS: 72 DEGREES
P OFFSET: 191 MS
P ONSET: 142 MS
PR INTERVAL: 172 MS
Q ONSET: 228 MS
QRS COUNT: 15 BEATS
QRS DURATION: 66 MS
QT INTERVAL: 372 MS
QTC CALCULATION(BAZETT): 452 MS
QTC FREDERICIA: 424 MS
R AXIS: 30 DEGREES
T AXIS: 7 DEGREES
T OFFSET: 414 MS
VENTRICULAR RATE: 89 BPM

## 2025-02-11 NOTE — PROGRESS NOTES
Subjective   Patient ID: Stacey Davis is a 69 y.o. female.    HPI  Patient presents today in follow-up of her recent hospitalization.  She was hospitalized with kidney stone and had to have ureteroscopy with stone basket retrieval cystoscopy and had a stent at that time which was removed yesterday  Dr Hurley  Stone was 90% calcium oxalate monohydrate 10% calcium oxalate dihydrate   Obviously miserable experience with the kidney stone was very pleased with her care at [facility]  She ever was noted to be anemic.  She is chronically constipated      Past medical history significant for  Hypercholesteremia  Severe osteoporosis  Ptosis and double vision  Anxiety  Chronic pain  Has been on Cymbalta for a while.    Review of Systems    Objective   Physical Exam  Well-developed appears younger than age no acute distress  HEENT exam unremarkable sclera nonicteric conjunctiva pink  Lungs clear to auscultation percussion  Cardiovascular regular rate and rhythm  No CVA tenderness  Abdomen is soft bowel sounds positive there is tenderness to palpation in the left lower quadrant    Assessment/Plan   #1Large renal calculi calcium oxalate requiring extraction much better now that the stent has been removed she will continue to push fluids she was given dietary instructions as well really does not eat any the foods that could have been contributing  2.  Anemia which was new in the hospital she is up-to-date with her colonoscopy first we will recheck CBC iron and B12 level and decide if further investigation is indicated  3.  Constipation chronic in nature she is up-to-date with colonoscopy this really has been longstanding has not take anything routinely I have asked her to try MiraLAX twice daily until she gets good results then we can decrease to once daily and even as needed  #4 diplopia chronic frustrating seeing neuro-ophthalmology in the near future

## 2025-02-11 NOTE — PROGRESS NOTES
Patient ID: Stacey Davis is a 69 y.o. female.     Procedures  PROCEDURE NOTE:     PREOPERATIVE DIAGNOSIS:  left ureteral stone     POSTOPERATIVE DIAGNOSIS:  Same     OPERATION:  Flexible Cystourethroscopy  Removal of left double J stent     SURGEON:  Yaneth Hurley MD     ANESTHESIA:  2%  lidocaine jelly     COMPLICATIONS:  None     EBL: Minimal     SPECIMEN:  Voided urine was not collected and submitted for culture.     DISPOSITION:  The patient was discharged home after the procedure, per routine.     INDICATIONS: :  Patient with a history of left ureteral stone who underwent left ureteroscopy and laser stone fragmentation with stent placement,  who presents today for Cystoscopy and stent removal.      The indications, risks and benefits of this procedure were discussed with the patient, consent was obtained prior to the procedure, and to the best of my judgement the patient seemed to understand and agree to the procedure.     PROCEDURE:  The patient  was brought into the procedure suite and informed consent was reviewed and confirmed. Vital signs were obtained prior to the procedure: There were no vitals taken for this visit.  The patient was escorted onto the stretcher, placed supine, prepped with betadine and draped in the usual standard surgical fashion.  Intraurethral 2% viscous lidocaine jelly was used for local analgesia.  A 16 Tajik flexible cystourethroscope was inserted into the urethra.   The urethra was normal.  Upon entering the bladder the entire bladder was surveyed in a 360 degree fashion.  The left and right ureteral orifices were in normal orthotopic position with the stent protruding out of the left ureteral orifice.   Using the stent grasper, the left double J stent was grasped and pulled out of the urethra.   The patient tolerated the procedure well.  Vitals were stable after the procedure.  The patient was able to void and was discharged home.  Verbal and written Post procedure  instructions were reviewed with the patient.     IMPRESSION:  Left ureteral stent removed smoothly     PLAN:  Encourage hydration  Follow up as needed     Patient's calculi stone analysis collected on 02/04/2025 demonstrates that the stone consists of 90% calcium oxalate monohydrate and 10 % calcium oxalate dihydrate.

## 2025-02-12 ENCOUNTER — APPOINTMENT (OUTPATIENT)
Dept: UROLOGY | Facility: CLINIC | Age: 70
End: 2025-02-12
Payer: MEDICARE

## 2025-02-12 DIAGNOSIS — Z96.0 URETERAL STENT PRESENT: Primary | ICD-10-CM

## 2025-02-12 LAB
POC APPEARANCE, URINE: ABNORMAL
POC BILIRUBIN, URINE: ABNORMAL
POC BLOOD, URINE: ABNORMAL
POC COLOR, URINE: ABNORMAL
POC GLUCOSE, URINE: ABNORMAL MG/DL
POC KETONES, URINE: ABNORMAL MG/DL
POC LEUKOCYTES, URINE: ABNORMAL
POC NITRITE,URINE: POSITIVE
POC PH, URINE: 5 PH
POC PROTEIN, URINE: ABNORMAL MG/DL
POC SPECIFIC GRAVITY, URINE: <=1.005
POC UROBILINOGEN, URINE: >=8 EU/DL

## 2025-02-12 PROCEDURE — 52310 CYSTOSCOPY AND TREATMENT: CPT | Performed by: STUDENT IN AN ORGANIZED HEALTH CARE EDUCATION/TRAINING PROGRAM

## 2025-02-13 ENCOUNTER — APPOINTMENT (OUTPATIENT)
Dept: LAB | Facility: HOSPITAL | Age: 70
End: 2025-02-13
Payer: MEDICARE

## 2025-02-13 ENCOUNTER — APPOINTMENT (OUTPATIENT)
Dept: PRIMARY CARE | Facility: CLINIC | Age: 70
End: 2025-02-13
Payer: MEDICARE

## 2025-02-13 VITALS — SYSTOLIC BLOOD PRESSURE: 114 MMHG | DIASTOLIC BLOOD PRESSURE: 70 MMHG

## 2025-02-13 DIAGNOSIS — I10 ESSENTIAL HYPERTENSION: ICD-10-CM

## 2025-02-13 DIAGNOSIS — H53.2 DOUBLE VISION: ICD-10-CM

## 2025-02-13 DIAGNOSIS — D50.8 OTHER IRON DEFICIENCY ANEMIA: Primary | ICD-10-CM

## 2025-02-13 LAB
ERYTHROCYTE [DISTWIDTH] IN BLOOD BY AUTOMATED COUNT: 13 % (ref 11.5–14.5)
HCT VFR BLD AUTO: 34.2 % (ref 36–46)
HGB BLD-MCNC: 11.3 G/DL (ref 12–16)
IRON SATN MFR SERPL: 27 % (ref 25–45)
IRON SERPL-MCNC: 114 UG/DL (ref 35–150)
MCH RBC QN AUTO: 28.8 PG (ref 26–34)
MCHC RBC AUTO-ENTMCNC: 33 G/DL (ref 32–36)
MCV RBC AUTO: 87 FL (ref 80–100)
NRBC BLD-RTO: 0 /100 WBCS (ref 0–0)
PLATELET # BLD AUTO: 348 X10*3/UL (ref 150–450)
RBC # BLD AUTO: 3.93 X10*6/UL (ref 4–5.2)
TIBC SERPL-MCNC: 426 UG/DL (ref 240–445)
UIBC SERPL-MCNC: 312 UG/DL (ref 110–370)
VIT B12 SERPL-MCNC: 363 PG/ML (ref 211–911)
WBC # BLD AUTO: 7.7 X10*3/UL (ref 4.4–11.3)

## 2025-02-13 PROCEDURE — 1111F DSCHRG MED/CURRENT MED MERGE: CPT | Performed by: INTERNAL MEDICINE

## 2025-02-13 PROCEDURE — 1036F TOBACCO NON-USER: CPT | Performed by: INTERNAL MEDICINE

## 2025-02-13 PROCEDURE — 83550 IRON BINDING TEST: CPT

## 2025-02-13 PROCEDURE — 82607 VITAMIN B-12: CPT

## 2025-02-13 PROCEDURE — 83540 ASSAY OF IRON: CPT

## 2025-02-13 PROCEDURE — 99214 OFFICE O/P EST MOD 30 MIN: CPT | Performed by: INTERNAL MEDICINE

## 2025-02-13 PROCEDURE — 1159F MED LIST DOCD IN RCRD: CPT | Performed by: INTERNAL MEDICINE

## 2025-02-13 PROCEDURE — 85027 COMPLETE CBC AUTOMATED: CPT

## 2025-02-13 PROCEDURE — 1160F RVW MEDS BY RX/DR IN RCRD: CPT | Performed by: INTERNAL MEDICINE

## 2025-02-13 PROCEDURE — 3078F DIAST BP <80 MM HG: CPT | Performed by: INTERNAL MEDICINE

## 2025-02-13 PROCEDURE — 3074F SYST BP LT 130 MM HG: CPT | Performed by: INTERNAL MEDICINE

## 2025-02-13 NOTE — PATIENT INSTRUCTIONS
I am glad you are doing so much better.  I agree make sure you are drinking at least 64 ounces of water daily  From the constipation standpoint lets have you take MiraLAX twice a day until you get good results and then back off to once daily.  You can take this indefinitely at that amount  Lets recheck your blood count as you were anemic in the hospital we will check an iron and B12 level as well

## 2025-02-17 ENCOUNTER — APPOINTMENT (OUTPATIENT)
Dept: UROLOGY | Facility: CLINIC | Age: 70
End: 2025-02-17
Payer: MEDICARE

## 2025-02-21 ENCOUNTER — APPOINTMENT (OUTPATIENT)
Dept: OPHTHALMOLOGY | Facility: CLINIC | Age: 70
End: 2025-02-21
Payer: MEDICARE

## 2025-02-21 DIAGNOSIS — H50.00 ESOTROPIA OF BOTH EYES: Primary | ICD-10-CM

## 2025-02-21 PROCEDURE — 99213 OFFICE O/P EST LOW 20 MIN: CPT | Performed by: STUDENT IN AN ORGANIZED HEALTH CARE EDUCATION/TRAINING PROGRAM

## 2025-02-21 ASSESSMENT — REFRACTION_MANIFEST
OS_SPHERE: -0.50
OD_SPHERE: -0.50
OD_CYLINDER: -0.75
OS_AXIS: 065
OD_ADD: +2.50
OS_ADD: +2.50
OD_AXIS: 110
OS_CYLINDER: -1.00

## 2025-02-21 ASSESSMENT — SLIT LAMP EXAM - LIDS
COMMENTS: NORMAL
COMMENTS: NORMAL

## 2025-02-21 ASSESSMENT — ENCOUNTER SYMPTOMS
MUSCULOSKELETAL NEGATIVE: 0
EYES NEGATIVE: 0
HEMATOLOGIC/LYMPHATIC NEGATIVE: 0
CONSTITUTIONAL NEGATIVE: 0
RESPIRATORY NEGATIVE: 0
CARDIOVASCULAR NEGATIVE: 0
PSYCHIATRIC NEGATIVE: 0
ENDOCRINE NEGATIVE: 0
GASTROINTESTINAL NEGATIVE: 0
NEUROLOGICAL NEGATIVE: 0
ALLERGIC/IMMUNOLOGIC NEGATIVE: 0

## 2025-02-21 ASSESSMENT — REFRACTION_WEARINGRX
OD_AXIS: 045
OD_CYLINDER: -1.00
OS_AXIS: 100
OD_ADD: +2.50
OS_CYLINDER: -1.25
OS_ADD: +2.50
OD_SPHERE: +1.50
OS_SPHERE: +1.50

## 2025-02-21 ASSESSMENT — REFRACTION_FINALRX
OS_HPRISM: 2 BO
OD_HPRISM: 2 BO

## 2025-02-21 ASSESSMENT — VISUAL ACUITY
METHOD: SNELLEN - LINEAR
OD_SC: 20/40
OS_SC: 20/30
OS_SC+: -1

## 2025-02-21 ASSESSMENT — CUP TO DISC RATIO
OS_RATIO: .4
OD_RATIO: .4

## 2025-02-21 ASSESSMENT — EXTERNAL EXAM - LEFT EYE: OS_EXAM: NORMAL

## 2025-02-21 ASSESSMENT — EXTERNAL EXAM - RIGHT EYE: OD_EXAM: NORMAL

## 2025-02-21 NOTE — PROGRESS NOTES
Assessment/Plan   Diagnoses and all orders for this visit:  Esotropia of both eyes  -patient referred from Dr. 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 Fernandez as planned or annual exams prn

## 2025-03-04 DIAGNOSIS — M25.50 ARTHRALGIA, UNSPECIFIED JOINT: ICD-10-CM

## 2025-03-04 RX ORDER — DULOXETIN HYDROCHLORIDE 30 MG/1
30 CAPSULE, DELAYED RELEASE ORAL DAILY
Qty: 90 CAPSULE | Refills: 2 | Status: SHIPPED | OUTPATIENT
Start: 2025-03-04

## 2025-04-21 ENCOUNTER — APPOINTMENT (OUTPATIENT)
Dept: OPHTHALMOLOGY | Facility: CLINIC | Age: 70
End: 2025-04-21
Payer: MEDICARE

## 2025-04-21 DIAGNOSIS — H53.2 DOUBLE VISION: ICD-10-CM

## 2025-04-21 DIAGNOSIS — H50.00 ESOTROPIA: Primary | ICD-10-CM

## 2025-04-21 PROCEDURE — 99213 OFFICE O/P EST LOW 20 MIN: CPT | Performed by: PSYCHIATRY & NEUROLOGY

## 2025-04-21 PROCEDURE — 92060 SENSORIMOTOR EXAMINATION: CPT | Performed by: PSYCHIATRY & NEUROLOGY

## 2025-04-21 ASSESSMENT — ENCOUNTER SYMPTOMS
HEMATOLOGIC/LYMPHATIC NEGATIVE: 0
CONSTITUTIONAL NEGATIVE: 0
MUSCULOSKELETAL NEGATIVE: 0
PSYCHIATRIC NEGATIVE: 0
GASTROINTESTINAL NEGATIVE: 0
CARDIOVASCULAR NEGATIVE: 0
RESPIRATORY NEGATIVE: 0
NEUROLOGICAL NEGATIVE: 0
ALLERGIC/IMMUNOLOGIC NEGATIVE: 0
ENDOCRINE NEGATIVE: 0
EYES NEGATIVE: 1

## 2025-04-21 ASSESSMENT — SLIT LAMP EXAM - LIDS
COMMENTS: NORMAL
COMMENTS: NORMAL

## 2025-04-21 ASSESSMENT — TONOMETRY
OS_IOP_MMHG: 15
IOP_METHOD: GOLDMANN APPLANATION
OD_IOP_MMHG: 14

## 2025-04-21 ASSESSMENT — VISUAL ACUITY
OD_CC: 20/20-1
OS_CC: 20/25
METHOD: SNELLEN - LINEAR

## 2025-04-21 ASSESSMENT — CUP TO DISC RATIO
OS_RATIO: .4
OD_RATIO: .4

## 2025-04-21 ASSESSMENT — EXTERNAL EXAM - RIGHT EYE: OD_EXAM: NORMAL

## 2025-04-21 ASSESSMENT — EXTERNAL EXAM - LEFT EYE: OS_EXAM: NORMAL

## 2025-04-21 NOTE — PROGRESS NOTES
Assessment and Plan    05/20/2023 MRI brain with contrast, which I personally reviewed previously, shows bihemispheric FLAIR white matter lesions, primarily deep white matter.    02/13/2025 B12 363.  06/26/2023 thyroid peroxidase antibody >1000. Acetylcholine receptor binding, blocking & modulating antibodies, thyroid stimulating immunoglobulin, thyrotropin receptor antibody negative.  01/02/2020 ESR 7. RF <10. MAXI 1:40 with negative CALEB.  07/22/2016 ESR 5. CRP <0.10 mg/dL. MAXI 1:80 with negative CALEB.  08/17/2012 CRP <0.30 mg/dL. MAXI 1:80 with negative CALEB.    This 69 year-old woman with a history of HTN, HLD presents in follow up for evaluation of diplopia and ptosis, previously with esotropia.    The esotropia that worsens in lateral gazes consistent with sagging eye syndrome remains similar. She is not helped well enough with updated glasses with prism. We discussed options of updating the prism, either ground or Fresnel, along with eye muscle surgery. Such consistency over time is very much against cranial nerve (CN) VI palsy, ocular myasthenia or thyroid eye disease.    At this point, we discussed options of strabismus surgery and more permanent prism along with Fresnel.    Plan    Referral to strabismus surgery.  Consider 3 PD base-out prism (DARREL) for both eyes with permanent prism rather than current 2 PD base-out prism (DARREL) both eyes.  Occlusion and head turning.    Follow up in 6-9 months with stereo plates, sooner if worsening. (dilated 06/19/2023)

## 2025-04-23 ENCOUNTER — CONSULT (OUTPATIENT)
Dept: OPHTHALMOLOGY | Facility: HOSPITAL | Age: 70
End: 2025-04-23
Payer: MEDICARE

## 2025-04-23 DIAGNOSIS — H53.2 DOUBLE VISION: ICD-10-CM

## 2025-04-23 DIAGNOSIS — H50.00 ESOTROPIA OF BOTH EYES: Primary | ICD-10-CM

## 2025-04-23 PROCEDURE — 99214 OFFICE O/P EST MOD 30 MIN: CPT | Performed by: OPHTHALMOLOGY

## 2025-04-23 PROCEDURE — 92060 SENSORIMOTOR EXAMINATION: CPT | Performed by: OPHTHALMOLOGY

## 2025-04-23 ASSESSMENT — VISUAL ACUITY
OS_CC+: -1
OS_CC: 20/30
CORRECTION_TYPE: GLASSES
OD_CC: 20/25
METHOD: SNELLEN - LINEAR

## 2025-04-23 ASSESSMENT — CONF VISUAL FIELD
OD_NORMAL: 1
OD_SUPERIOR_TEMPORAL_RESTRICTION: 0
OD_INFERIOR_TEMPORAL_RESTRICTION: 0
OS_INFERIOR_NASAL_RESTRICTION: 0
OS_NORMAL: 1
OD_INFERIOR_NASAL_RESTRICTION: 0
OS_INFERIOR_TEMPORAL_RESTRICTION: 0
OS_SUPERIOR_NASAL_RESTRICTION: 0
OD_SUPERIOR_NASAL_RESTRICTION: 0
OS_SUPERIOR_TEMPORAL_RESTRICTION: 0
METHOD: COUNTING FINGERS

## 2025-04-23 ASSESSMENT — SLIT LAMP EXAM - LIDS
COMMENTS: NORMAL
COMMENTS: NORMAL

## 2025-04-23 ASSESSMENT — REFRACTION_WEARINGRX
OS_CYLINDER: +1.00
OD_AXIS: 020
OS_ADD: +2.50
OD_ADD: +2.50
OD_HPRISM: 2 BO
OS_HPRISM: 2 BO
SPECS_TYPE: PAL
OD_CYLINDER: +1.00
OS_AXIS: 157
OS_SPHERE: -1.50
OD_SPHERE: -1.25

## 2025-04-23 ASSESSMENT — EXTERNAL EXAM - RIGHT EYE: OD_EXAM: NORMAL

## 2025-04-23 ASSESSMENT — ENCOUNTER SYMPTOMS
RESPIRATORY NEGATIVE: 0
NEUROLOGICAL NEGATIVE: 0
CARDIOVASCULAR NEGATIVE: 1
ENDOCRINE NEGATIVE: 0
ALLERGIC/IMMUNOLOGIC NEGATIVE: 0
HEMATOLOGIC/LYMPHATIC NEGATIVE: 0
MUSCULOSKELETAL NEGATIVE: 0
EYES NEGATIVE: 1
CONSTITUTIONAL NEGATIVE: 0
GASTROINTESTINAL NEGATIVE: 0
PSYCHIATRIC NEGATIVE: 0

## 2025-04-23 ASSESSMENT — EXTERNAL EXAM - LEFT EYE: OS_EXAM: NORMAL

## 2025-04-23 NOTE — PROGRESS NOTES
68 y/o with E(T) here for surgical evaluation.    Single vision achieved with 8 PD.   Discussed updating specs versus surgery.  Will trial 8 PD specs and reassess if needed. Patient agreeable.

## 2025-05-07 DIAGNOSIS — I10 ESSENTIAL HYPERTENSION: ICD-10-CM

## 2025-05-07 RX ORDER — AMLODIPINE BESYLATE AND OLMESARTAN MEDOXOMIL 5; 20 MG/1; MG/1
1 TABLET ORAL DAILY
Qty: 90 TABLET | Refills: 2 | Status: SHIPPED | OUTPATIENT
Start: 2025-05-07

## 2025-05-22 DIAGNOSIS — Z00.00 ROUTINE HEALTH MAINTENANCE: ICD-10-CM

## 2025-06-02 ENCOUNTER — LAB (OUTPATIENT)
Dept: LAB | Facility: HOSPITAL | Age: 70
End: 2025-06-02
Payer: MEDICARE

## 2025-06-02 DIAGNOSIS — Z00.00 ENCOUNTER FOR GENERAL ADULT MEDICAL EXAMINATION WITHOUT ABNORMAL FINDINGS: Primary | ICD-10-CM

## 2025-06-02 PROCEDURE — 86141 C-REACTIVE PROTEIN HS: CPT

## 2025-06-02 PROCEDURE — 80053 COMPREHEN METABOLIC PANEL: CPT

## 2025-06-02 PROCEDURE — 84443 ASSAY THYROID STIM HORMONE: CPT

## 2025-06-02 PROCEDURE — 80061 LIPID PANEL: CPT

## 2025-06-02 PROCEDURE — 83036 HEMOGLOBIN GLYCOSYLATED A1C: CPT

## 2025-06-02 PROCEDURE — 82306 VITAMIN D 25 HYDROXY: CPT

## 2025-06-02 PROCEDURE — 85025 COMPLETE CBC W/AUTO DIFF WBC: CPT

## 2025-06-02 PROCEDURE — 36415 COLL VENOUS BLD VENIPUNCTURE: CPT

## 2025-06-03 LAB
25(OH)D3 SERPL-MCNC: 81 NG/ML (ref 30–100)
ALBUMIN SERPL BCP-MCNC: 4.5 G/DL (ref 3.4–5)
ALP SERPL-CCNC: 102 U/L (ref 33–136)
ALT SERPL W P-5'-P-CCNC: 10 U/L (ref 7–45)
ANION GAP SERPL CALC-SCNC: 13 MMOL/L (ref 10–20)
AST SERPL W P-5'-P-CCNC: 17 U/L (ref 9–39)
BASOPHILS # BLD AUTO: 0.05 X10*3/UL (ref 0–0.1)
BASOPHILS NFR BLD AUTO: 1 %
BILIRUB SERPL-MCNC: 0.5 MG/DL (ref 0–1.2)
BUN SERPL-MCNC: 28 MG/DL (ref 6–23)
CALCIUM SERPL-MCNC: 9.7 MG/DL (ref 8.6–10.6)
CHLORIDE SERPL-SCNC: 105 MMOL/L (ref 98–107)
CHOLEST SERPL-MCNC: 299 MG/DL (ref 0–199)
CHOLESTEROL/HDL RATIO: 4.4
CO2 SERPL-SCNC: 26 MMOL/L (ref 21–32)
CREAT SERPL-MCNC: 0.9 MG/DL (ref 0.5–1.05)
CRP SERPL HS-MCNC: 0.5 MG/L
EGFRCR SERPLBLD CKD-EPI 2021: 70 ML/MIN/1.73M*2
EOSINOPHIL # BLD AUTO: 0.09 X10*3/UL (ref 0–0.7)
EOSINOPHIL NFR BLD AUTO: 1.8 %
ERYTHROCYTE [DISTWIDTH] IN BLOOD BY AUTOMATED COUNT: 14.3 % (ref 11.5–14.5)
EST. AVERAGE GLUCOSE BLD GHB EST-MCNC: 114 MG/DL
GLUCOSE SERPL-MCNC: 91 MG/DL (ref 74–99)
HBA1C MFR BLD: 5.6 % (ref ?–5.7)
HCT VFR BLD AUTO: 38.4 % (ref 36–46)
HDLC SERPL-MCNC: 67.4 MG/DL
HGB BLD-MCNC: 12.1 G/DL (ref 12–16)
IMM GRANULOCYTES # BLD AUTO: 0.06 X10*3/UL (ref 0–0.7)
IMM GRANULOCYTES NFR BLD AUTO: 1.2 % (ref 0–0.9)
LDLC SERPL CALC-MCNC: 204 MG/DL
LYMPHOCYTES # BLD AUTO: 1.5 X10*3/UL (ref 1.2–4.8)
LYMPHOCYTES NFR BLD AUTO: 30.3 %
MCH RBC QN AUTO: 28.1 PG (ref 26–34)
MCHC RBC AUTO-ENTMCNC: 31.5 G/DL (ref 32–36)
MCV RBC AUTO: 89 FL (ref 80–100)
MONOCYTES # BLD AUTO: 0.51 X10*3/UL (ref 0.1–1)
MONOCYTES NFR BLD AUTO: 10.3 %
NEUTROPHILS # BLD AUTO: 2.74 X10*3/UL (ref 1.2–7.7)
NEUTROPHILS NFR BLD AUTO: 55.4 %
NON HDL CHOLESTEROL: 232 MG/DL (ref 0–149)
NRBC BLD-RTO: 0 /100 WBCS (ref 0–0)
PLATELET # BLD AUTO: 252 X10*3/UL (ref 150–450)
POTASSIUM SERPL-SCNC: 4.6 MMOL/L (ref 3.5–5.3)
PROT SERPL-MCNC: 7.1 G/DL (ref 6.4–8.2)
RBC # BLD AUTO: 4.3 X10*6/UL (ref 4–5.2)
SODIUM SERPL-SCNC: 139 MMOL/L (ref 136–145)
TRIGL SERPL-MCNC: 138 MG/DL (ref 0–149)
TSH SERPL-ACNC: 2.01 MIU/L (ref 0.44–3.98)
VLDL: 28 MG/DL (ref 0–40)
WBC # BLD AUTO: 5 X10*3/UL (ref 4.4–11.3)

## 2025-06-04 ENCOUNTER — LAB (OUTPATIENT)
Dept: LAB | Facility: HOSPITAL | Age: 70
End: 2025-06-04
Payer: MEDICARE

## 2025-06-04 ENCOUNTER — APPOINTMENT (OUTPATIENT)
Dept: PRIMARY CARE | Facility: CLINIC | Age: 70
End: 2025-06-04
Payer: MEDICARE

## 2025-06-04 LAB
APPEARANCE UR: CLEAR
BILIRUB UR STRIP.AUTO-MCNC: NEGATIVE MG/DL
COLOR UR: NORMAL
GLUCOSE UR STRIP.AUTO-MCNC: NORMAL MG/DL
KETONES UR STRIP.AUTO-MCNC: NEGATIVE MG/DL
LEUKOCYTE ESTERASE UR QL STRIP.AUTO: NEGATIVE
NITRITE UR QL STRIP.AUTO: NEGATIVE
PH UR STRIP.AUTO: 6 [PH]
PROT UR STRIP.AUTO-MCNC: NEGATIVE MG/DL
RBC # UR STRIP.AUTO: NEGATIVE MG/DL
SP GR UR STRIP.AUTO: 1.02
UROBILINOGEN UR STRIP.AUTO-MCNC: NORMAL MG/DL

## 2025-06-04 PROCEDURE — 81003 URINALYSIS AUTO W/O SCOPE: CPT

## 2025-06-05 LAB — HOLD SPECIMEN: NORMAL

## 2025-06-06 ENCOUNTER — APPOINTMENT (OUTPATIENT)
Dept: PRIMARY CARE | Facility: CLINIC | Age: 70
End: 2025-06-06
Payer: MEDICARE

## 2025-06-12 NOTE — PROGRESS NOTES
Physical Exam    Name Stacey Davis    Date of Service :6/12/2025      Stacey Davis  69 y.o. is here for an annual physical exam  Hypercholesteremia and intolerance to statin therapy has been on praluent with great success she has now seen preventative cardiology  not taking because of issues with bruising at the injection site but no other side effects stress test May 23 normal    CACS 2018 15.64    Hypertension of the last few years-although difficult to control but recently has been good     Severe osteoporosis with T-scores -3.9 in the femoral neck and -4.2 in the spine   in 2024 she has not been interested in treatment with bisphosphonates or Prolia because of potential GI issues her  is a dentist and is very worried about this she has had multiple implants      Multiple visual issues with ptosis double vision saw neuro-ophthalmologist very concerned MRI was performed nonspecific Jay's fingers found now has prisms in her glasses  She did just see Dr. Fernandez has been referred for strabismus surgery   Increased the prism      During that workup she was found to have Hashimoto's thyroiditis and has been seeing Dr. Hagen.      She had been on Cymbalta she was weaned off this and then felt very poorly so she has restarted Cymbalta are somewhat better but she still feels the Cymbalta was responsible for weight gain which she really hates.  She is having some increased aches and pains has considered increasing the dose to 60 mg    Multiple aches and pains question of fibromyalgia has been raised she is to be better with the Cymbalta she does sometimes have some pins and her upper extremities from the elbows to the hands as well    Weight gain which has been very irritating for her     She does have TMJ  and is worried about  her mouth issues her  is a dentist and she thinks this is the biggest issue she has not contemplated taking meds for her osteoporosis    She had recent hospitalization  for renal calculi pretty miserable experience  2/25 she was hospitalized she had had basket lithotripsy retrieval during that hospitalization she was found to be anemic as well    Having knee pain  cannot knee  Chest discomfort   with burning  in chest was bad last night   Night time is worse   burning almost like a rotten egg she has had this on several occasions recently  Feels her diet is actually pretty good not sure why she would have heartburn    Last colonoscopy 2023 5-year repeat for polyp  Last mammogram 4/24  Breast MRI    Up-to-date with immunizations including pneumonia vaccine  Medical History[1]    Surgical History[2]     Social History[3]     Social History     Social History Narrative    He is originally from [place] met her  at [facility] she has been  for about 45 years    She was a  by training and did work for "[employer] for a couple of years after getting  and having children 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    Physical Exam  Physical examination  Reveals a well-developed woman in no acute distress    appearance is age-appropriate  HEENT exam  Extraocular motion is intact  Tympanic membranes and external auditory canals are normal  Oropharynx is normal  There is no cervical lymphadenopathy appreciated  The thyroid is within normal limits    Lungs    clear to auscultation and percussion    Cardiovascular   regular rate and rhythm  No murmurs rubs or gallops are appreciated    Breast examination   No dominant masses nipple discharge or axillary  "lymphadenopathy is appreciated    Abdomen   soft nontender bowel sounds are positive   there is no organomegaly noted      Periphery  Pulses are present without deficits noted  No peripheral edema is noted    Musculoskeletal  Just below the right knee there is a tender area that slightly swollen seems to be more of a bone bursa  Gait is normal  Is no joint erythema or swelling noted  Range of motion is within normal limits  Strength is 5 of 5 without deficits noted    Dermatology  No concerning skin lesions are noted    Neurology  No deficits are noted  Judgment appears appropriate  Mood and affect are appropriate                                No lab exists for component: \"UAPPEAR\", \"UCOLOR\"  No components found for: \"UA\"  Orders Only on 05/22/2025   Component Date Value Ref Range Status    WBC 06/02/2025 5.0  4.4 - 11.3 x10*3/uL Final    nRBC 06/02/2025 0.0  0.0 - 0.0 /100 WBCs Final    RBC 06/02/2025 4.30  4.00 - 5.20 x10*6/uL Final    Hemoglobin 06/02/2025 12.1  12.0 - 16.0 g/dL Final    Hematocrit 06/02/2025 38.4  36.0 - 46.0 % Final    MCV 06/02/2025 89  80 - 100 fL Final    MCH 06/02/2025 28.1  26.0 - 34.0 pg Final    MCHC 06/02/2025 31.5 (L)  32.0 - 36.0 g/dL Final    RDW 06/02/2025 14.3  11.5 - 14.5 % Final    Platelets 06/02/2025 252  150 - 450 x10*3/uL Final    Neutrophils % 06/02/2025 55.4  40.0 - 80.0 % Final    Immature Granulocytes %, Automated 06/02/2025 1.2 (H)  0.0 - 0.9 % Final    Immature Granulocyte Count (IG) includes promyelocytes, myelocytes and metamyelocytes but does not include bands. Percent differential counts (%) should be interpreted in the context of the absolute cell counts (cells/UL).    Lymphocytes % 06/02/2025 30.3  13.0 - 44.0 % Final    Monocytes % 06/02/2025 10.3  2.0 - 10.0 % Final    Eosinophils % 06/02/2025 1.8  0.0 - 6.0 % Final    Basophils % 06/02/2025 1.0  0.0 - 2.0 % Final    Neutrophils Absolute 06/02/2025 2.74  1.20 - 7.70 x10*3/uL Final    Percent differential counts " (%) should be interpreted in the context of the absolute cell counts (cells/uL).    Immature Granulocytes Absolute, Au* 06/02/2025 0.06  0.00 - 0.70 x10*3/uL Final    Lymphocytes Absolute 06/02/2025 1.50  1.20 - 4.80 x10*3/uL Final    Monocytes Absolute 06/02/2025 0.51  0.10 - 1.00 x10*3/uL Final    Eosinophils Absolute 06/02/2025 0.09  0.00 - 0.70 x10*3/uL Final    Basophils Absolute 06/02/2025 0.05  0.00 - 0.10 x10*3/uL Final    Glucose 06/02/2025 91  74 - 99 mg/dL Final    Sodium 06/02/2025 139  136 - 145 mmol/L Final    Potassium 06/02/2025 4.6  3.5 - 5.3 mmol/L Final    Chloride 06/02/2025 105  98 - 107 mmol/L Final    Bicarbonate 06/02/2025 26  21 - 32 mmol/L Final    Anion Gap 06/02/2025 13  10 - 20 mmol/L Final    Urea Nitrogen 06/02/2025 28 (H)  6 - 23 mg/dL Final    Creatinine 06/02/2025 0.90  0.50 - 1.05 mg/dL Final    eGFR 06/02/2025 70  >60 mL/min/1.73m*2 Final    Calculations of estimated GFR are performed using the 2021 CKD-EPI Study Refit equation without the race variable for the IDMS-Traceable creatinine methods.  https://jasn.asnjournals.org/content/early/2021/09/22/ASN.5790655176    Calcium 06/02/2025 9.7  8.6 - 10.6 mg/dL Final    Albumin 06/02/2025 4.5  3.4 - 5.0 g/dL Final    Alkaline Phosphatase 06/02/2025 102  33 - 136 U/L Final    Total Protein 06/02/2025 7.1  6.4 - 8.2 g/dL Final    AST 06/02/2025 17  9 - 39 U/L Final    Bilirubin, Total 06/02/2025 0.5  0.0 - 1.2 mg/dL Final    ALT 06/02/2025 10  7 - 45 U/L Final    Patients treated with Sulfasalazine may generate falsely decreased results for ALT.    Cholesterol 06/02/2025 299 (H)  0 - 199 mg/dL Final          Age      Desirable   Borderline High   High     0-19 Y     0 - 169       170 - 199     >/= 200    20-24 Y     0 - 189       190 - 224     >/= 225         >24 Y     0 - 199       200 - 239     >/= 240   **All ranges are based on fasting samples. Specific   therapeutic targets will vary based on patient-specific   cardiac  risk.    Pediatric guidelines reference:Pediatrics 2011, 128(S5).Adult guidelines reference: NCEP ATPIII Guidelines,SERGIO 2001, 258:2486-91    Venipuncture immediately after or during the administration of Metamizole may lead to falsely low results. Testing should be performed immediately prior to Metamizole dosing.    HDL-Cholesterol 06/02/2025 67.4  mg/dL Final      Age       Very Low   Low     Normal    High    0-19 Y    < 35      < 40     40-45     ----  20-24 Y    ----     < 40      >45      ----        >24 Y      ----     < 40     40-60      >60      Cholesterol/HDL Ratio 06/02/2025 4.4   Final      Ref Values  Desirable  < 3.4  High Risk  > 5.0    LDL Calculated 06/02/2025 204 (H)  <=99 mg/dL Final                                Near   Borderline      AGE      Desirable  Optimal    High     High     Very High     0-19 Y     0 - 109     ---    110-129   >/= 130     ----    20-24 Y     0 - 119     ---    120-159   >/= 160     ----      >24 Y     0 -  99   100-129  130-159   160-189     >/=190      VLDL 06/02/2025 28  0 - 40 mg/dL Final    Triglycerides 06/02/2025 138  0 - 149 mg/dL Final    Age              Desirable        Borderline         High        Very High  SEX:B           mg/dL             mg/dL               mg/dL      mg/dL  <=14D                       ----               ----        ----  15D-365D                    ----               ----        ----  1Y-9Y           0-74               75-99             >=100       ----  10Y-19Y        0-89                            >=130       ----  20Y-24Y        0-114             115-149             >=150      ----  >= 25Y         0-149             150-199             200-499    >=500      Venipuncture immediately after or during the administration of Metamizole may lead to falsely low results. Testing should be performed immediately prior to Metamizole dosing.    Non HDL Cholesterol 06/02/2025 232 (H)  0 - 149 mg/dL Final          Age        Desirable   Borderline High   High     Very High     0-19 Y     0 - 119       120 - 144     >/= 145    >/= 160    20-24 Y     0 - 149       150 - 189     >/= 190      ----         >24 Y    30 mg/dL above LDL Cholesterol goal      Thyroid Stimulating Hormone 06/02/2025 2.01  0.44 - 3.98 mIU/L Final    CRP, High Sensitivity 06/02/2025 0.5  <1.0 mg/L Final    Vitamin D, 25-Hydroxy, Total 06/02/2025 81  30 - 100 ng/mL Final    Hemoglobin A1C 06/02/2025 5.6  See comment % Final    Estimated Average Glucose 06/02/2025 114  Not Established mg/dL Final    Color, Urine 06/04/2025 Light-Yellow  Light-Yellow, Yellow, Dark-Yellow Final    Appearance, Urine 06/04/2025 Clear  Clear Final    Specific Gravity, Urine 06/04/2025 1.016  1.005 - 1.035 Final    pH, Urine 06/04/2025 6.0  5.0, 5.5, 6.0, 6.5, 7.0, 7.5, 8.0 Final    Protein, Urine 06/04/2025 NEGATIVE  NEGATIVE, 10 (TRACE), 20 (TRACE) mg/dL Final    Glucose, Urine 06/04/2025 Normal  Normal mg/dL Final    Blood, Urine 06/04/2025 NEGATIVE  NEGATIVE mg/dL Final    Ketones, Urine 06/04/2025 NEGATIVE  NEGATIVE mg/dL Final    Bilirubin, Urine 06/04/2025 NEGATIVE  NEGATIVE mg/dL Final    Urobilinogen, Urine 06/04/2025 Normal  Normal mg/dL Final    Nitrite, Urine 06/04/2025 NEGATIVE  NEGATIVE Final    Leukocyte Esterase, Urine 06/04/2025 NEGATIVE  NEGATIVE Final    Extra Tube 06/04/2025 Hold for add-ons.   Final    Auto resulted.     [unfilled]   Imaging  No results found.    Cardiology, Vascular, and Other Imaging  No other imaging results found for the past 2 days     The 10-year ASCVD risk score (Elroy LONGORIA, et al., 2019) is: 8.2%    Values used to calculate the score:      Age: 69 years      Sex: Female      Is Non- : No      Diabetic: No      Tobacco smoker: No      Systolic Blood Pressure: 114 mmHg      Is BP treated: Yes      HDL Cholesterol: 67.4 mg/dL      Total Cholesterol: 299 mg/dL   .  ECG: normal sinus rhythm, no blocks or conduction defects,  no ischemic changes.     Problem List Items Addressed This Visit    None      Assessment/Plan   #1 hypercholesteremia cholesterol is quite elevated she has seen preventative cardiology in the past she had been on Praluent with good success no real side effects other than bruising at the site we discussed the importance of treating the cholesterol she is willing to go back on the medication I have sent prescription to  pharmacy in hopes that they may be able to get better price plan to recheck in about 3 months we did discuss possibility of repeat coronary artery calcium score but I do not think this would add anything as still I will see her LDL less than 100  2.  Hypertension good control continue current regimen  3.  Osteoporosis very concerning  very low T-scores she knows she is at high risk of fracture she has not wanted to take medications because of potential side effects she may be willing to consider Evista which could give us some benefit although as we discussed certainly not as much as a bisphosphonate but could also have some benefit for preventing breast cancer  4.  Anxiety doing pretty well with Cymbalta  5.  Bursitis right knee conservative measures she can use Voltaren gel should resolve over time  6.  Health maintenance  She does see dermatology routinely  Repeat colonoscopy 2028  Mammogram requisition was given  MRI recommended 6 months because of increased risk of breast cancer  Up-to-date with immunizations  Continue routine regular exercise  #7 multiple visual issues with diplopia follows routinely with neuro-ophthalmologist at this point nothing further to do that continue with prisms       [1]   Past Medical History:  Diagnosis Date    Age-related osteoporosis without current pathological fracture 01/04/2023    Osteoporosis    Anxiety     BRCA1 negative     Double vision 04/09/2024    Hydronephrosis of left kidney 02/03/2025    Hydroureter on left 02/03/2025    Hypercholesteremia      Hypertension     Kidney stone on left side 02/03/2025    Nasal congestion 06/01/2021    Sinus congestion    Stenosis of cervical spine     Thyroid nodule     TMJ arthropathy     Unspecified symptoms and signs involving the genitourinary system 11/27/2020    Urinary symptom or sign   [2]   Past Surgical History:  Procedure Laterality Date    BELT ABDOMINOPLASTY  10/15/2014    Abdominoplasty    HYSTERECTOMY  10/15/2014    Supracervical Hysterectomy    MOUTH SURGERY  10/15/2014    Oral Surgery Tooth Extraction    TONSILLECTOMY  10/15/2014    Tonsillectomy With Adenoidectomy    TOTAL ABDOMINAL HYSTERECTOMY W/ BILATERAL SALPINGOOPHORECTOMY  10/15/2014    Salpingo-oophorectomy Bilateral   [3]   Social History  Tobacco Use    Smoking status: Never    Smokeless tobacco: Never   Vaping Use    Vaping status: Never Used   Substance Use Topics    Alcohol use: Never    Drug use: Never   [4]   Family History  Problem Relation Name Age of Onset    Breast cancer Mother      Arthritis Father      Heart attack Father  46    Breast cancer Sister  45    Breast cancer Sister  45    No Known Problems Sister      Breast cancer Maternal Grandmother  40    Glaucoma Maternal Grandfather

## 2025-06-16 ENCOUNTER — APPOINTMENT (OUTPATIENT)
Dept: PRIMARY CARE | Facility: CLINIC | Age: 70
End: 2025-06-16
Payer: MEDICARE

## 2025-06-16 ENCOUNTER — OFFICE VISIT (OUTPATIENT)
Dept: PRIMARY CARE | Facility: CLINIC | Age: 70
End: 2025-06-16
Payer: MEDICARE

## 2025-06-16 VITALS
HEART RATE: 82 BPM | WEIGHT: 123.02 LBS | SYSTOLIC BLOOD PRESSURE: 108 MMHG | OXYGEN SATURATION: 97 % | BODY MASS INDEX: 22.64 KG/M2 | HEIGHT: 62 IN | DIASTOLIC BLOOD PRESSURE: 60 MMHG

## 2025-06-16 VITALS
DIASTOLIC BLOOD PRESSURE: 60 MMHG | BODY MASS INDEX: 22.64 KG/M2 | HEIGHT: 62 IN | WEIGHT: 123.02 LBS | HEART RATE: 82 BPM | OXYGEN SATURATION: 97 % | SYSTOLIC BLOOD PRESSURE: 108 MMHG

## 2025-06-16 DIAGNOSIS — I10 ESSENTIAL HYPERTENSION: ICD-10-CM

## 2025-06-16 DIAGNOSIS — R03.0 ELEVATED BLOOD PRESSURE READING: Primary | ICD-10-CM

## 2025-06-16 DIAGNOSIS — R07.9: ICD-10-CM

## 2025-06-16 DIAGNOSIS — M81.8 OTHER OSTEOPOROSIS WITHOUT CURRENT PATHOLOGICAL FRACTURE: ICD-10-CM

## 2025-06-16 DIAGNOSIS — K21.9: ICD-10-CM

## 2025-06-16 DIAGNOSIS — E78.00 HYPERCHOLESTEREMIA: ICD-10-CM

## 2025-06-16 DIAGNOSIS — K21.9 GASTROESOPHAGEAL REFLUX DISEASE WITHOUT ESOPHAGITIS: ICD-10-CM

## 2025-06-16 DIAGNOSIS — Z12.31 SCREENING MAMMOGRAM FOR BREAST CANCER: Primary | ICD-10-CM

## 2025-06-16 PROCEDURE — 1036F TOBACCO NON-USER: CPT | Performed by: INTERNAL MEDICINE

## 2025-06-16 PROCEDURE — 1125F AMNT PAIN NOTED PAIN PRSNT: CPT | Performed by: INTERNAL MEDICINE

## 2025-06-16 PROCEDURE — 3078F DIAST BP <80 MM HG: CPT | Performed by: INTERNAL MEDICINE

## 2025-06-16 PROCEDURE — 3008F BODY MASS INDEX DOCD: CPT | Performed by: INTERNAL MEDICINE

## 2025-06-16 PROCEDURE — G0439 PPPS, SUBSEQ VISIT: HCPCS | Performed by: INTERNAL MEDICINE

## 2025-06-16 PROCEDURE — 3074F SYST BP LT 130 MM HG: CPT | Performed by: INTERNAL MEDICINE

## 2025-06-16 PROCEDURE — 1159F MED LIST DOCD IN RCRD: CPT | Performed by: INTERNAL MEDICINE

## 2025-06-16 RX ORDER — ALIROCUMAB 150 MG/ML
150 INJECTION, SOLUTION SUBCUTANEOUS
Qty: 2 ML | Refills: 3 | Status: SHIPPED | OUTPATIENT
Start: 2025-06-16

## 2025-06-16 RX ORDER — OMEPRAZOLE 20 MG/1
20 CAPSULE, DELAYED RELEASE ORAL DAILY
Qty: 30 CAPSULE | Refills: 11 | Status: SHIPPED | OUTPATIENT
Start: 2025-06-16 | End: 2026-06-16

## 2025-06-16 ASSESSMENT — PAIN SCALES - GENERAL
PAINLEVEL_OUTOF10: 2
PAINLEVEL_OUTOF10: 2

## 2025-06-16 NOTE — PATIENT INSTRUCTIONS
It was good to see you.  You are doing a good job with your overall health care  I am concerned about your cholesterol and I do recommend you go back on the Praulent as it did work well for you  Could consider repeating your coronary artery calcium score but basically the recommendation for you is that we need to have your LDL less than 100 from previous coronary artery calcium score which did show low-level calcification  I am also very concerned about your osteoporosis and your high risk of fracture I know you have not been interested in treatment options especially with bisphosphonates because of the dental side effects I think we could consider Evista or raloxifene which should be  helpful as we will get some benefit with your bone density but also get some benefit for prevention of breast cancer with your family history  Lets do mammogram now as you are past due and then 6 months from now lets do self-pay screening breast MRI because of your high risk of breast cancer  The knee pain is consistent with a bursitis usually these resolve over time but certainly a trial of Voltaren gel up to 4 times daily to the knee may be helpful continue to protect the knee with kneepads Ace wraps etc.  Your symptoms of chest discomfort are most consistent with gastroesophageal reflux and we will try Prilosec 20 mg daily we will have  you take it about half an hour before your evening meal do this consistently over about the next 3 weeks.  Let me know if your symptoms persist or if anything changes  Colonoscopy 2028

## 2025-06-16 NOTE — PROGRESS NOTES
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    Her diet is really pretty healthy overall    She exercises by walking routinely and does Pilates about 3-4 times weekly    She has 3 children 2 sons 1 daughter and 6 grandchildren  5 boys   1 girl  fortunately all of her children and grandchildren live in the area at this time    She has never been a smoker         Allergies and Medications  Alendronate and Statins-hmg-coa reductase inhibitors  Medications Ordered Prior to Encounter[3]    Medications and Supplements  prescribed by me and other practitioners or clinical pharmacist (such as prescriptions, OTC's, herbal therapies and supplements) were reviewed and documented in the medical record.    Tobacco/Alcohol/Opioid use, as well as Illicit Drug Use was screened for/reviewed and documented in Social History section and medication list as appropriate  Activities of Daily Living  In your present state of health, do you have any difficulty performing the following activities?:   Preparing food and eating?: No  Bathing yourself: No  Getting dressed: No  Using the toilet:No  Moving around from place to place: No  In the past year have you fallen or had a near fall?:No    Depression Screen  (Note: if answer to either of the following is \"Yes\", then a more complete depression screening is indicated)   Q1: Over the past two weeks, have you felt down, depressed or hopeless? No  Q2: Over the past two weeks, " "have you felt little interest or pleasure in doing things? No    Current exercise habits: She exercises routinely she walks she does Pilates multiple days weekly as well  Dietary issues discussed: Yes  Hearing difficulties: No  Safe in current home environment: yes  Visual Acuity assessed: no  Cognitive Impairment assessed: no       Advance directives  Advanced Care Planning (including a Living Will, Healthcare POA, as well as specific end of life choices and/or directives), was discussed   Cardiac Risk Assessment  Cardiovascular risk was discussed and, if needed, lifestyle modifications recommended, including nutritional choices, exercise, and elimination of habits contributing to risk. We agreed on a plan to reduce the current cardiovascular risk based on above discussion as needed.  Aspirin use/disuse was discussed after reviewing the updated guidelines below:    Consider low dose Aspirin ( mg) use if the benefit for cardiovascular disease prevention outweighs risk for bleeding complications.   In general, low dose ASA should be considered:  In patients WITHOUT prior MI/stroke/PAD (primary prevention):   a. Age <60: Use if 10-year cardiovascular disease risk >20%, with discussion of risks and benefits with patient  b. Age 60-<70: Use if 10-year cardiovascular disease risk >20% and low bleeding (e.g., gastrointenstinal) risk, with discussion of risks and benefits with patient  c. Age >=70: Do not use    In patients WITH prior MI/stroke/PAD (secondary prevention):   Generally use unless extremely high bleeding (e.g., gastrointenstinal) risk, with discussion of risks and benefits with patient    ROS otherwise negative aside from what was mentioned above in HPI.    Vitals  /60 (BP Location: Right arm, Patient Position: Sitting, BP Cuff Size: Adult)   Pulse 82   Ht 1.575 m (5' 2\")   Wt 55.8 kg (123 lb 0.3 oz)   SpO2 97%   BMI 22.50 kg/m²   Body mass index is 22.5 kg/m².  Physical Exam  Gen: Alert, " NAD  HEENT:  PERRLA, EOMI, conjunctiva and sclera normal in appearance. External auditory canals/TMs normal; Oral cavity and posterior pharynx without lesions/exudate  Neck:  Supple with FROM; No masses/nodes palpable; Thyroid nontender and without nodules; No LINDY  Respiratory:  Lungs CTAB  Cardiovascular:  Heart RRR. No M/R/G. Peripheral pulses equal bilaterally  Abdomen:  Soft, nontender, BS present throughout; No R/G/R; No HSM or masses palpated  Extremities:  FROM all extremities; Muscle strength grossly normal with good tone  Neuro:  CN II-XII intact; Reflexes 2+/2+; Gross motor and sensory intact  Skin:  No suspicious lesions present  Breast: No masses, skin lesions or nipple discharges, no axillary lymphadenopathy    Assessment and Plan:  Problem List Items Addressed This Visit    None    #1 hypercholesteremia cholesterol is quite elevated she has seen preventative cardiology in the past she had been on Praluent with good success no real side effects other than bruising at the site we discussed the importance of treating the cholesterol she is willing to go back on the medication I have sent prescription to  pharmacy in hopes that they may be able to get better price plan to recheck in about 3 months we did discuss possibility of repeat coronary artery calcium score but I do not think this would add anything as still I will see her LDL less than 100  2.  Hypertension good control continue current regimen  3.  Osteoporosis very concerning  very low T-scores she knows she is at high risk of fracture she has not wanted to take medications because of potential side effects she may be willing to consider Evista which could give us some benefit although as we discussed certainly not as much as a bisphosphonate but could also have some benefit for preventing breast cancer  4.  Anxiety doing pretty well with Cymbalta  5.  Bursitis right knee conservative measures she can use Voltaren gel should resolve over  time  6.  Health maintenance  She does see dermatology routinely  Repeat colonoscopy 2028  Mammogram requisition was given  MRI recommended 6 months because of increased risk of breast cancer  Up-to-date with immunizations  Continue routine regular exercise  #7 multiple visual issues with diplopia follows routinely with neuro-ophthalmologist at this point nothing further to do that continue with prisms      During the course of the visit the patient was educated and counseled about age appropriate screening and preventive services. Completed preventive screenings were documented in the chart and orders were placed for outstanding screenings/procedures as documented in the Assessment and Plan.      Patient Instructions (the written plan) was given to the patient at check out.      Asia Bob MD        [1]   Past Medical History:  Diagnosis Date    Age-related osteoporosis without current pathological fracture 01/04/2023    Osteoporosis    Anxiety     BRCA1 negative     Double vision 04/09/2024    Hydronephrosis of left kidney 02/03/2025    Hydroureter on left 02/03/2025    Hypercholesteremia     Hypertension     Kidney stone on left side 02/03/2025    Nasal congestion 06/01/2021    Sinus congestion    Stenosis of cervical spine     Thyroid nodule     TMJ arthropathy     Unspecified symptoms and signs involving the genitourinary system 11/27/2020    Urinary symptom or sign   [2]   Past Surgical History:  Procedure Laterality Date    BELT ABDOMINOPLASTY  10/15/2014    Abdominoplasty    HYSTERECTOMY  10/15/2014    Supracervical Hysterectomy    MOUTH SURGERY  10/15/2014    Oral Surgery Tooth Extraction    TONSILLECTOMY  10/15/2014    Tonsillectomy With Adenoidectomy    TOTAL ABDOMINAL HYSTERECTOMY W/ BILATERAL SALPINGOOPHORECTOMY  10/15/2014    Salpingo-oophorectomy Bilateral   [3]   Current Outpatient Medications on File Prior to Visit   Medication Sig Dispense Refill    amlodipine-olmesartan (Helen) 5-20 mg  tablet TAKE ONE TABLET BY MOUTH DAILY 90 tablet 2    cholecalciferol, vitamin D3, (VITAMIN D3 ORAL)       docusate sodium (Colace) 100 mg capsule Take 1 capsule (100 mg) by mouth 2 times a day.      DULoxetine (Cymbalta) 30 mg DR capsule TAKE 1 CAPSULE (30 MG) BY MOUTH ONCE DAILY. DO NOT CRUSH OR CHEW. 90 capsule 2    magnesium, amino acid chelate, 133 mg tablet Take 1 tablet (133 mg) by mouth 2 times a day.      polyethylene glycol (Glycolax, Miralax) 17 gram packet Take 17 g by mouth once daily.      vitamin K2, MK-4, 100 mcg tablet Take by mouth.      alirocumab (Praluent Pen) 150 mg/mL pen injector Inject 1 mL (150 mg) under the skin every 14 (fourteen) days. 2 mL 3    omeprazole (PriLOSEC) 20 mg DR capsule Take 1 capsule (20 mg) by mouth once daily. Do not crush or chew. 30 capsule 11     No current facility-administered medications on file prior to visit.

## 2025-07-07 DIAGNOSIS — E78.00 HYPERCHOLESTEREMIA: ICD-10-CM

## 2025-07-07 RX ORDER — ALIROCUMAB 150 MG/ML
150 INJECTION, SOLUTION SUBCUTANEOUS
Qty: 2 ML | Refills: 3 | Status: SHIPPED | OUTPATIENT
Start: 2025-07-07

## 2025-07-08 ENCOUNTER — APPOINTMENT (OUTPATIENT)
Dept: RADIOLOGY | Facility: HOSPITAL | Age: 70
End: 2025-07-08
Payer: MEDICARE

## 2025-07-08 VITALS — BODY MASS INDEX: 22.63 KG/M2 | HEIGHT: 62 IN | WEIGHT: 123 LBS

## 2025-07-08 DIAGNOSIS — Z12.31 SCREENING MAMMOGRAM FOR BREAST CANCER: ICD-10-CM

## 2025-07-08 PROCEDURE — 77063 BREAST TOMOSYNTHESIS BI: CPT | Performed by: RADIOLOGY

## 2025-07-08 PROCEDURE — 77067 SCR MAMMO BI INCL CAD: CPT

## 2025-07-08 PROCEDURE — 77067 SCR MAMMO BI INCL CAD: CPT | Performed by: RADIOLOGY

## 2025-07-09 DIAGNOSIS — R92.8 ABNORMAL MAMMOGRAM: Primary | ICD-10-CM

## 2025-07-18 ENCOUNTER — HOSPITAL ENCOUNTER (OUTPATIENT)
Dept: RADIOLOGY | Facility: HOSPITAL | Age: 70
Discharge: HOME | End: 2025-07-18
Payer: MEDICARE

## 2025-07-18 DIAGNOSIS — R92.8 ABNORMAL MAMMOGRAM: ICD-10-CM

## 2025-07-18 PROCEDURE — 76981 USE PARENCHYMA: CPT | Mod: LT

## 2025-07-18 PROCEDURE — 76642 ULTRASOUND BREAST LIMITED: CPT | Mod: LT

## 2025-07-18 PROCEDURE — 77065 DX MAMMO INCL CAD UNI: CPT | Mod: LT

## 2025-10-30 ENCOUNTER — APPOINTMENT (OUTPATIENT)
Dept: OPHTHALMOLOGY | Facility: CLINIC | Age: 70
End: 2025-10-30
Payer: MEDICARE

## 2026-01-09 ENCOUNTER — APPOINTMENT (OUTPATIENT)
Dept: OPHTHALMOLOGY | Facility: HOSPITAL | Age: 71
End: 2026-01-09
Payer: MEDICARE

## 2026-02-25 ENCOUNTER — APPOINTMENT (OUTPATIENT)
Dept: OPHTHALMOLOGY | Facility: CLINIC | Age: 71
End: 2026-02-25
Payer: MEDICARE

## (undated) DEVICE — BASKET, URETERAL, STONE RETRIEVAL, N-CIRCLE, NITNOL

## (undated) DEVICE — CATHETER, URETERAL, POLLACK, OPEN END, 5.5 FR, 70 CM

## (undated) DEVICE — Device

## (undated) DEVICE — STOPCOCK, SAN ANTONIO, W/MODIFIED FITTING

## (undated) DEVICE — TUBING, SUCTION, CONNECTING, NON-CONDUCTIVE, SURE GRIP CONNECTORS, 3/16 IN X 10 FT

## (undated) DEVICE — GUIDEWIRE, ULTRA TRACK, HYBRID, 0.038 IN STRAIGHT TIP

## (undated) DEVICE — BAG, PRESSURE INFUSER, 3000 CC, LF

## (undated) DEVICE — TOWEL PACK, STERILE, 4/PACK, BLUE

## (undated) DEVICE — PREP TRAY, SKIN, DRY, W/GLOVES

## (undated) DEVICE — IRRIGATION SET, CYSTOSCOPY, TURP, Y, CONTINUOUS, 81 IN

## (undated) DEVICE — MARKER, SKIN, DUAL TIP INK W/9 LABEL AND REMOVABLE TIME OUT SLEEVE

## (undated) DEVICE — COLLECTION BAG, FLUID, NON-STERILE